# Patient Record
Sex: FEMALE | URBAN - METROPOLITAN AREA
[De-identification: names, ages, dates, MRNs, and addresses within clinical notes are randomized per-mention and may not be internally consistent; named-entity substitution may affect disease eponyms.]

---

## 2023-04-13 ENCOUNTER — APPOINTMENT (OUTPATIENT)
Dept: URBAN - METROPOLITAN AREA CLINIC 198 | Age: 58
Setting detail: DERMATOLOGY
End: 2023-04-13

## 2023-04-13 DIAGNOSIS — L71.8 OTHER ROSACEA: ICD-10-CM

## 2023-04-13 DIAGNOSIS — L82.1 OTHER SEBORRHEIC KERATOSIS: ICD-10-CM

## 2023-04-13 DIAGNOSIS — L738 OTHER SPECIFIED DISEASES OF HAIR AND HAIR FOLLICLES: ICD-10-CM

## 2023-04-13 DIAGNOSIS — L663 OTHER SPECIFIED DISEASES OF HAIR AND HAIR FOLLICLES: ICD-10-CM

## 2023-04-13 DIAGNOSIS — L82.0 INFLAMED SEBORRHEIC KERATOSIS: ICD-10-CM

## 2023-04-13 DIAGNOSIS — L91.8 OTHER HYPERTROPHIC DISORDERS OF THE SKIN: ICD-10-CM

## 2023-04-13 DIAGNOSIS — L40.0 PSORIASIS VULGARIS: ICD-10-CM

## 2023-04-13 DIAGNOSIS — D22 MELANOCYTIC NEVI: ICD-10-CM

## 2023-04-13 PROBLEM — D22.72 MELANOCYTIC NEVI OF LEFT LOWER LIMB, INCLUDING HIP: Status: ACTIVE | Noted: 2023-04-13

## 2023-04-13 PROBLEM — D22.5 MELANOCYTIC NEVI OF TRUNK: Status: ACTIVE | Noted: 2023-04-13

## 2023-04-13 PROBLEM — L02.425 FURUNCLE OF RIGHT LOWER LIMB: Status: ACTIVE | Noted: 2023-04-13

## 2023-04-13 PROBLEM — D22.71 MELANOCYTIC NEVI OF RIGHT LOWER LIMB, INCLUDING HIP: Status: ACTIVE | Noted: 2023-04-13

## 2023-04-13 PROBLEM — L02.426 FURUNCLE OF LEFT LOWER LIMB: Status: ACTIVE | Noted: 2023-04-13

## 2023-04-13 PROBLEM — D22.62 MELANOCYTIC NEVI OF LEFT UPPER LIMB, INCLUDING SHOULDER: Status: ACTIVE | Noted: 2023-04-13

## 2023-04-13 PROBLEM — D22.61 MELANOCYTIC NEVI OF RIGHT UPPER LIMB, INCLUDING SHOULDER: Status: ACTIVE | Noted: 2023-04-13

## 2023-04-13 PROCEDURE — OTHER COUNSELING: OTHER

## 2023-04-13 PROCEDURE — 17110 DESTRUCT B9 LESION 1-14: CPT

## 2023-04-13 PROCEDURE — OTHER PRESCRIPTION MEDICATION MANAGEMENT: OTHER

## 2023-04-13 PROCEDURE — OTHER PRESCRIPTION: OTHER

## 2023-04-13 PROCEDURE — OTHER SKIN TAG REMOVAL MULTI (COSMETIC): OTHER

## 2023-04-13 PROCEDURE — 99214 OFFICE O/P EST MOD 30 MIN: CPT | Mod: 25

## 2023-04-13 PROCEDURE — OTHER LIQUID NITROGEN: OTHER

## 2023-04-13 RX ORDER — CLINDAMYCIN PHOSPHATE 10 MG/ML
LOTION TOPICAL QD
Qty: 60 | Refills: 5 | Status: ERX | COMMUNITY
Start: 2023-04-13

## 2023-04-13 RX ORDER — CLOBETASOL PROPIONATE 0.5 MG/ML
SOLUTION TOPICAL QHS
Qty: 50 | Refills: 0 | Status: ERX | COMMUNITY
Start: 2023-04-13

## 2023-04-13 ASSESSMENT — LOCATION DETAILED DESCRIPTION DERM
LOCATION DETAILED: LEFT INFERIOR MEDIAL MALAR CHEEK
LOCATION DETAILED: LEFT PROXIMAL DORSAL FOREARM
LOCATION DETAILED: RIGHT INFERIOR MEDIAL MALAR CHEEK
LOCATION DETAILED: RIGHT DISTAL POSTERIOR THIGH
LOCATION DETAILED: RIGHT AXILLARY VAULT
LOCATION DETAILED: EPIGASTRIC SKIN
LOCATION DETAILED: RIGHT KNEE
LOCATION DETAILED: INFERIOR THORACIC SPINE
LOCATION DETAILED: RIGHT PROXIMAL DORSAL FOREARM
LOCATION DETAILED: LEFT DISTAL LATERAL POSTERIOR THIGH
LOCATION DETAILED: POSTERIOR MID-PARIETAL SCALP
LOCATION DETAILED: LEFT AXILLARY VAULT
LOCATION DETAILED: LEFT KNEE

## 2023-04-13 ASSESSMENT — LOCATION SIMPLE DESCRIPTION DERM
LOCATION SIMPLE: LEFT POSTERIOR THIGH
LOCATION SIMPLE: RIGHT CHEEK
LOCATION SIMPLE: ABDOMEN
LOCATION SIMPLE: LEFT KNEE
LOCATION SIMPLE: RIGHT AXILLARY VAULT
LOCATION SIMPLE: RIGHT FOREARM
LOCATION SIMPLE: POSTERIOR SCALP
LOCATION SIMPLE: UPPER BACK
LOCATION SIMPLE: LEFT CHEEK
LOCATION SIMPLE: RIGHT POSTERIOR THIGH
LOCATION SIMPLE: RIGHT KNEE
LOCATION SIMPLE: LEFT AXILLARY VAULT
LOCATION SIMPLE: LEFT FOREARM

## 2023-04-13 ASSESSMENT — LOCATION ZONE DERM
LOCATION ZONE: FACE
LOCATION ZONE: AXILLAE
LOCATION ZONE: ARM
LOCATION ZONE: SCALP
LOCATION ZONE: TRUNK
LOCATION ZONE: LEG

## 2023-04-13 ASSESSMENT — ITCH NUMERIC RATING SCALE: HOW SEVERE IS YOUR ITCHING?: 0

## 2023-04-13 ASSESSMENT — BSA PSORIASIS: % BODY COVERED IN PSORIASIS: 0

## 2023-04-13 NOTE — PROCEDURE: COUNSELING
Detail Level: Zone
Detail Level: Detailed
Sunscreen Recommendations: recommend FBSE next visit
Detail Level: Simple

## 2023-04-13 NOTE — PROCEDURE: PRESCRIPTION MEDICATION MANAGEMENT
Continue Regimen: Clindamycin lotion bid prn flares
Detail Level: Detailed
Render In Strict Bullet Format?: No
Continue Regimen: Clobetasol solution prn

## 2023-04-13 NOTE — PROCEDURE: SKIN TAG REMOVAL MULTI (COSMETIC)
Total Number Of Lesions Treated: 10
Price (Use Numbers Only, No Special Characters Or $): 152.60
Consent: Written consent obtained and the risks of skin tag removal was reviewed with the patient including but not limited to bleeding, pigmentary change, infection, pain, and remote possibility of scarring.
Detail Level: Detailed
Hemostasis: Aluminum Chloride
Anesthesia Type: 1% lidocaine with epinephrine
Anesthesia Volume In Cc: 1
Removed With: scissors

## 2023-09-25 ENCOUNTER — APPOINTMENT (EMERGENCY)
Dept: RADIOLOGY | Facility: HOSPITAL | Age: 58
End: 2023-09-25
Payer: COMMERCIAL

## 2023-09-25 ENCOUNTER — HOSPITAL ENCOUNTER (EMERGENCY)
Facility: HOSPITAL | Age: 58
Discharge: HOME | End: 2023-09-25
Attending: EMERGENCY MEDICINE
Payer: COMMERCIAL

## 2023-09-25 VITALS
OXYGEN SATURATION: 100 % | HEART RATE: 58 BPM | TEMPERATURE: 97.6 F | RESPIRATION RATE: 16 BRPM | DIASTOLIC BLOOD PRESSURE: 87 MMHG | SYSTOLIC BLOOD PRESSURE: 136 MMHG

## 2023-09-25 DIAGNOSIS — S09.90XA HEAD INJURY, INITIAL ENCOUNTER: Primary | ICD-10-CM

## 2023-09-25 PROCEDURE — 70486 CT MAXILLOFACIAL W/O DYE: CPT | Mod: MG

## 2023-09-25 PROCEDURE — 99284 EMERGENCY DEPT VISIT MOD MDM: CPT | Mod: 25

## 2023-09-25 PROCEDURE — G1004 CDSM NDSC: HCPCS

## 2023-09-25 RX ORDER — METOPROLOL TARTRATE 25 MG/1
25 TABLET, FILM COATED ORAL DAILY
COMMUNITY

## 2023-09-25 NOTE — DISCHARGE INSTRUCTIONS
You were seen in the ED after being kicked in the head by a horse. Your workup, including CT, were negative for any acute findings including broken bones or intracranial bleeding.     You may follow up with your primary care provider as needed.     Please return to the ED if you have nausea, vomiting, worsening headache, changes in your vision, sensory changes, or develop difficulty walking.

## 2023-09-25 NOTE — ED ATTESTATION NOTE
Procedures      9/25/2023  2:11 PM  I have personally seen and examined the patient.  I personally performed the key components of the encounter and provided medical decision making for this patient. I reviewed and agree with the PA/NP/Resident's assessment and plan of care, with any exceptions as documented below.    My focused history, examination, assessment, and plan of care of Mónica Bates is as follows:    HPI: The patient is a 57 y.o. who comes to the ED for facial injury.  Was kicked in the head by a horse.  No LOC.  No prior history of concussion.  Was not wearing a helmet.  Not on any thinners.  Was initially confused about the incident but now starting to remember.  Did have transient nausea but resolved.  Has a little scrape on the left elbow but has no difficulty ranging and no significant pain.    I was provided additional history from: Self.    Pertinent past medical history includes: Hypertension      Exam: Awake, alert, pleasant, no distress.  Ecchymosis left periocular.  PERRL, EOMI.  Afebrile, normotensive, heart rate regular.  Speech clear.  GCS 15.  Moves all extremity spontaneously.  Vital Signs Review: Vital signs have been reviewed. The oxygen saturation is SpO2: 100 %  which is normal.          Impression/Plan/Medical decision making/ED course: Here for evaluation after head injury.  Neuro exam nonfocal.  CT head without acute intracranial traumatic findings.  Hematoma left frontal scalp noted.  She has full extraocular movements.  Discussed possible concussive symptoms and refer to concussion clinic if needed.  Family members at bedside.  Patient comfortable with discharge           Imaging:  I independently reviewed imaging done in the ED as a wet read.    Disposition: DC            NOTE: Patient seen during a time of significantly increased volumes, decreased capacity and staff. Portion of management and initial evaluation may have been done while in the waiting room because of  this. This document was created using dragon dictation software.  There might be some typographical errors due to this technology.         Queta Goodwin MD  09/25/23 1530

## 2023-09-25 NOTE — ED PROVIDER NOTES
"Emergency Medicine Note  HPI   HISTORY OF PRESENT ILLNESS     HPI   Patient is a 57F no significant PMHx presents to the ED s/p being kicked in the head by a horse. Patient states incident happened PTA. No helmet. Struck in face. - thinners. ?LOC as patient states she was \"in and out\" indicating amnesia to event. Some lightheadedness after assoc with nausea though no longer lightheaded or nauseated. Now c/o pain in L side of face and a scrape on dorsal aspect L elbow. Patient denies double vision, blurred vision.       Patient History   PAST HISTORY     Reviewed from Nursing Triage:       History reviewed. No pertinent past medical history.    Past Surgical History:   Procedure Laterality Date    KNEE SURGERY Right        History reviewed. No pertinent family history.    Social History     Tobacco Use    Smoking status: Never    Smokeless tobacco: Never   Substance Use Topics    Alcohol use: Yes    Drug use: Never         Review of Systems   REVIEW OF SYSTEMS     Review of Systems      VITALS     ED Vitals    Date/Time Temp Pulse Resp BP SpO2 [facility]   09/25/23 1317 36.4 °C (97.6 °F) 58 16 136/87 100 % BL                       Physical Exam   PHYSICAL EXAM     Physical Exam  Constitutional:       General: She is not in acute distress.     Appearance: She is normal weight. She is not ill-appearing.   HENT:      Head:      Comments: Periorbital ecchymosis, edema  Small hematoma to L forehead     Nose: Nose normal. No congestion.      Mouth/Throat:      Mouth: Mucous membranes are moist.      Pharynx: Oropharynx is clear.      Comments: No loose teeth; jaw stable  Eyes:      Extraocular Movements: Extraocular movements intact.      Conjunctiva/sclera: Conjunctivae normal.      Pupils: Pupils are equal, round, and reactive to light.      Comments: Pupils 2 and reactive   Cardiovascular:      Rate and Rhythm: Normal rate and regular rhythm.      Pulses: Normal pulses.      Heart sounds: Normal heart sounds.   Pulmonary: "      Effort: Pulmonary effort is normal.      Breath sounds: Normal breath sounds.   Abdominal:      General: Abdomen is flat. There is no distension.      Palpations: Abdomen is soft.      Tenderness: There is no abdominal tenderness.   Musculoskeletal:         General: No swelling, tenderness or deformity.      Comments: Strength 5/5 BL UEs   Skin:     General: Skin is warm and dry.      Findings: Bruising present.      Comments: Superficial abrasion to dorsal aspect of L elbow   Neurological:      General: No focal deficit present.      Mental Status: She is alert and oriented to person, place, and time.      Cranial Nerves: No cranial nerve deficit.      Sensory: No sensory deficit.      Motor: No weakness.   Psychiatric:         Mood and Affect: Mood normal.         Behavior: Behavior normal.           PROCEDURES     Procedures     DATA     Results     None          Imaging Results    None         No orders to display       Scoring tools                                  ED Course & MDM   MDM / ED COURSE / CLINICAL IMPRESSION / DISPO     Medical Decision Making  Amount and/or Complexity of Data Reviewed  Radiology: ordered.        Patient is a 57F no significant PMHx presents to the ED s/p being kicked in the head by a horse. AAOx3. GCS15. Moving all exremities. CTH and CT facial bones no acute intraranial hemorrhage or calvarial fracture, no facial bone fracture, orbit intact; sot tissue sweling and hematoma to L forehead. Pain well controlled. NAD. VSS. Patient given strict return precautions, DC instructions. Patient discussed with Dr Goodwin.   ED Course as of 09/25/23 1522   Mon Sep 25, 2023   1521 CTH  IMPRESSION:  1. No evidence of acute intracranial pathology or calvarial fracture.  2. There is soft tissue swelling and hematoma formation within the left frontal  scalp which extends to involve the left preseptal periorbital tissues. The globe  is intact and there is no evidence of retrobulbar hemorrhage.  3.  No evidence of orbital or facial bone fracture. []      ED Course User Index  [] April Hong DO     Clinical Impression      None               April Hong DO  Resident  09/25/23 7971

## 2023-09-29 ENCOUNTER — HOSPITAL ENCOUNTER (EMERGENCY)
Facility: HOSPITAL | Age: 58
Discharge: HOME | End: 2023-09-29
Attending: EMERGENCY MEDICINE | Admitting: EMERGENCY MEDICINE
Payer: COMMERCIAL

## 2023-09-29 ENCOUNTER — APPOINTMENT (EMERGENCY)
Dept: RADIOLOGY | Facility: HOSPITAL | Age: 58
End: 2023-09-29
Payer: COMMERCIAL

## 2023-09-29 VITALS
HEART RATE: 62 BPM | OXYGEN SATURATION: 97 % | TEMPERATURE: 97 F | DIASTOLIC BLOOD PRESSURE: 80 MMHG | RESPIRATION RATE: 16 BRPM | SYSTOLIC BLOOD PRESSURE: 166 MMHG

## 2023-09-29 DIAGNOSIS — S09.90XA CLOSED HEAD INJURY, INITIAL ENCOUNTER: Primary | ICD-10-CM

## 2023-09-29 DIAGNOSIS — S06.0X0A CONCUSSION WITHOUT LOSS OF CONSCIOUSNESS, INITIAL ENCOUNTER: ICD-10-CM

## 2023-09-29 DIAGNOSIS — H57.12 PAIN OF LEFT EYE: ICD-10-CM

## 2023-09-29 PROCEDURE — 99284 EMERGENCY DEPT VISIT MOD MDM: CPT | Mod: 25

## 2023-09-29 PROCEDURE — G1004 CDSM NDSC: HCPCS

## 2023-09-29 ASSESSMENT — TONOMETRY
OS_IOP_MMHG: 20
OD_IOP_MMHG: 20

## 2023-09-29 ASSESSMENT — VISUAL ACUITY: OU: 1

## 2023-09-29 NOTE — ED ATTESTATION NOTE
Procedures  Physical Exam  Review of Systems  Medical Decision Making      9/29/20233:11 PM  I have personally seen and examined the patient.  I personally performed the key components of the encounter and provided a significant portion of the care and medical decision making for this patient. I reviewed and agree with the PA/NP/Resident's assessment and plan of care, with any exceptions as documented below    My focused history, examination, assessment, and plan of care of Mónica Bates is as follows:  The history is provided by Patient  The patient is a 57 y.o. who comes to the ED for left eye pressure, discomfort,  No new trauma.  Patient had an old trauma to the left face.  Patient with significant bruising.  Patient notes that she has been dealing with the symptoms since her prior presentation here but had not had significant headaches or dizziness.    Pertinent past medical history includes: Noncontributory  History reviewed. No pertinent past medical history.      Past Surgical History:   Procedure Laterality Date    KNEE SURGERY Right        Exam: Ecchymosis overlying the left eye. Some reproducible symptoms with certain movements      Impression/Plan/Medical decision making/ED course: 57-year-old presenting with left eye pressure, discomfort, no new trauma, patient recently kicked in the head by a horse, initial differential was for possibility of subdural hematoma, but also consider postconcussive type syndrome, less likely I specifically related pathology, eye pressures were obtained, visual acuity was also good, patient had CT scan which was reassuring and patient was subsequently discharged with instructions for close follow-up.  She is otherwise to return with any new or worsening symptoms.       The patient The patient was discharged after careful consideration for any admission needs    Medications - No data to display    ECG review: ECG read by me: N/A   No orders to display       While here I  have   Reviewed previous records in our system  Reviewed prior outpatient and ER visits  Reviewed care everywhere for outside records Reviewed prior outpatient and ER visits  Reviewed and interpreted lab results N/A  Reviewed and interpreted xrays N/A  Compared lab results to previous lab results N/A  Compared image results to previous results .  Called and spoke with a consultant or physician about this case N/A  Reviewed and independently interpreted ultrasound results and images N/A  Reviewed and independently interpreted CT imaging results and images Overall reassuring without significant abnormalities    Vital Signs Review: Vital signs have been reviewed. The oxygen saturation is SpO2: 98 %  which is Normal      I was physically present for the key/critical portions of the following procedures: none     Lennox Shah MD  09/30/23 0152

## 2023-09-29 NOTE — DISCHARGE INSTRUCTIONS
You were seen and evaluated in the emergency department today for eye pain/pressure.  Your CT scan does not show any signs of bleeding inside the brain.  This could be due to a concussion/head injury.  Please follow-up with the concussion clinic at [facility].  Please also follow-up with your eye doctor.    Make sure that you are staying hydrated and drinking lots of fluids.  Please alternate water and electrolyte solution such as Gatorade or Pedialyte.    You may take Tylenol or Motrin as needed for pain as directed on the bottle.  Please do not exceed 3 g of Tylenol in 24 hours.    Please continue to limit prolonged screen time time in front of TV/cell phone/computers    Return to the emergency department any worsening symptoms, confusion, persistent vomiting

## 2023-09-29 NOTE — ED PROVIDER NOTES
"Emergency Medicine Note  HPI   HISTORY OF PRESENT ILLNESS     Patient is a 57-year-old female with no significant past medical history who presents emergency department for evaluation for headache and left eye pressure.  Patient was recently seen and evaluated here after being kicked by a horse in the head on 9/25/2023.  Patient had negative CT head/facial bones for any acute bleeding or fractures but did have some soft tissue swelling.  Patient ports that over the last couple of days she has noted increased pressure behind the left eye and has noted some visual changes on the peripheral aspects of her vision in her left eye and states it seems \"lighter than normal\".  Patient wears glasses/contacts and was recently seen by her eye doctor a couple of months ago.  Denies any eye redness or tearing.  When the injury occurred patient had questionable loss of consciousness and is slightly amnestic to the event and was told that she was confused at first.  She is not on any blood thinning medications.  Denies any neck or back pain, nausea, vomiting, chest pain, shortness of breath, numbness or weakness.  She reports some dizziness and lightheadedness with certain eye/head movements and whenever she is ambulating.    9/25/23 CTH and Facial bones  IMPRESSION:  1. No evidence of acute intracranial pathology or calvarial fracture.  2. There is soft tissue swelling and hematoma formation within the left frontal  scalp which extends to involve the left preseptal periorbital tissues. The globe  is intact and there is no evidence of retrobulbar hemorrhage.  3. No evidence of orbital or facial bone fracture.            Patient History   PAST HISTORY     Reviewed from Nursing Triage:       History reviewed. No pertinent past medical history.    Past Surgical History:   Procedure Laterality Date    KNEE SURGERY Right        History reviewed. No pertinent family history.    Social History     Tobacco Use    Smoking status: Never "    Smokeless tobacco: Never   Substance Use Topics    Alcohol use: Yes    Drug use: Never         Review of Systems   REVIEW OF SYSTEMS     Review of Systems      VITALS     ED Vitals    Date/Time Temp Pulse Resp BP SpO2 [facility]   09/29/23 1327 36.1 °C (97 °F) 62 16 166/80 97 % BL                       Physical Exam   PHYSICAL EXAM     Physical Exam  Vitals and nursing note reviewed.   Constitutional:       Appearance: She is normal weight. She is not ill-appearing, toxic-appearing or diaphoretic.   HENT:      Head: Normocephalic. Contusion present. No raccoon eyes, Ta's sign, abrasion, right periorbital erythema, left periorbital erythema or laceration.      Comments: Bilateral periorbital ecchymosis left greater than right     Right Ear: External ear normal.      Left Ear: External ear normal.      Nose: Nose normal. No nasal deformity, signs of injury or nasal tenderness.      Mouth/Throat:      Lips: Pink.      Mouth: Mucous membranes are moist.      Pharynx: Oropharynx is clear.   Eyes:      General: Lids are normal. Vision grossly intact. Gaze aligned appropriately. No scleral icterus.     Intraocular pressure: Right eye pressure is 20 mmHg. Left eye pressure is 20 mmHg.      Extraocular Movements: Extraocular movements intact.      Conjunctiva/sclera: Conjunctivae normal.      Pupils: Pupils are equal, round, and reactive to light.      Comments: Extraocular movements are intact although patient does report some pain with eye movements.  Visual acuity 20/13 left, 20/10 right   Cardiovascular:      Rate and Rhythm: Normal rate and regular rhythm.      Pulses: Normal pulses.      Heart sounds: Normal heart sounds. No murmur heard.  Pulmonary:      Effort: Pulmonary effort is normal. No respiratory distress.      Breath sounds: Normal breath sounds.   Musculoskeletal:         General: Normal range of motion.      Cervical back: Normal range of motion and neck supple. No tenderness.   Skin:     General: Skin  is warm and dry.      Capillary Refill: Capillary refill takes less than 2 seconds.   Neurological:      General: No focal deficit present.      Mental Status: She is alert and oriented to person, place, and time.      GCS: GCS eye subscore is 4. GCS verbal subscore is 5. GCS motor subscore is 6.      Cranial Nerves: Cranial nerves 2-12 are intact.      Sensory: Sensation is intact.      Motor: Motor function is intact.      Coordination: Coordination is intact.      Gait: Gait is intact.           PROCEDURES     Procedures     DATA     Results     None          Imaging Results          CT HEAD WITHOUT IV CONTRAST (Final result)  Result time 09/29/23 16:23:01    Final result                 Impression:    IMPRESSION:    1. No CT evidence of an acute large vascular territorial infarct, acute  intracranial hemorrhage or intra-axial mass effect.  2. Minimally low-lying right cerebellar tonsil.  3. Mild nonspecific periventricular white matter disease. This may be related to  chronic microangiopathy in the appropriate clinical setting.             Narrative:      CLINICAL HISTORY:  Headache and left eye pressure. Recent trauma.    COMMENT:  An unenhanced CT scan of the brain was performed from the foramen magnum to the  vertex. Coronal and sagittal reconstructions were obtained.    CT DOSE:  One or more dose reduction techniques (e.g. automated exposure  control, adjustment of the mA and/or kV according to patient size, use of  iterative reconstruction technique) utilized for this examination.    Comparison: Brain CT performed 9/25/2023      Findings:  The right cerebellar tonsil is minimally low-lying, compared to the prior. The  ventricles, sulci and cisternal spaces are unremarkable. Mild nonspecific  periventricular white matter disease is present which appears similar. There is  no loss of the gray-white matter differentiation to suggest an acute large  vascular territorial infarction.  No acute intracranial  "hemorrhage, intra-axial  mass effect or extra-axial fluid collection is identified.    A left frontal scalp hematoma is present which is diminished in size compared to  the prior. There is no evidence of a depressed calvarial fracture. The  visualized portions of the paranasal sinuses and mastoid air cells appear  patent.                                  No orders to display       Scoring tools                                  ED Course & MDM   MDM / ED COURSE / CLINICAL IMPRESSION / DISPO     Medical Decision Making  Patient is a 57-year-old female with no significant past medical history who presents emergency department for evaluation for headache and left eye pressure.  Patient was recently seen and evaluated here after being kicked by a horse in the head on 9/25/2023.  Patient had negative CT head/facial bones for any acute bleeding or fractures but did have some soft tissue swelling.  Patient ports that over the last couple of days she has noted increased pressure in the left eye and has noted some visual changes on the peripheral aspects of her vision in her left eye and states it seems \"lighter than normal\".  Patient wears glasses/contacts and was recently seen by her eye doctor a couple of months ago.  When the injury occurred patient had questionable loss of consciousness and is slightly amnestic to the event and was told that she was confused at first.  She is not on any blood thinning medications.  Denies any neck or back pain, nausea, vomiting, chest pain, shortness of breath, numbness or weakness.  She reports some dizziness and lightheadedness with certain eye/head movements and whenever she is ambulating.    Vital Signs Review: Vital signs have been reviewed. The oxygen saturation is SpO2: 97 % which is normal.    Plan: CTH    Repeat head CT negative for any delayed bleed.  Let patient follow-up with PCP, eye specialist, and concussion clinic as needed.    Closed head injury, initial encounter: acute " illness or injury  Concussion without loss of consciousness, initial encounter: acute illness or injury  Pain of left eye: acute illness or injury  Amount and/or Complexity of Data Reviewed  Independent Historian: spouse  External Data Reviewed: radiology and notes.  Radiology: ordered. Decision-making details documented in ED Course.      Risk  OTC drugs.          ED Course as of 09/29/23 1713   Fri Sep 29, 2023   1526 Discussed with ED attending, Dr. Palacio, who is in agreement with plan. Also saw/evaluated patient [GS]   1642 CT HEAD WITHOUT IV CONTRAST  IMPRESSION:   IMPRESSION:     1. No CT evidence of an acute large vascular territorial infarct, acute  intracranial hemorrhage or intra-axial mass effect.  2. Minimally low-lying right cerebellar tonsil.  3. Mild nonspecific periventricular white matter disease. This may be related to  chronic microangiopathy in the appropriate clinical setting.  1. No CT evidence of an acute large vascular territorial infarct, acute  intracranial hemorrhage or intra-axial mass effect.  2. Minimally low-lying right cerebellar tonsil.  3. Mild nonspecific periventricular white matter disease. This may be related to  chronic microangiopathy in the appropriate clinical setting. [GS]   1710 Discharge paperwork reviewed with patient.  Follow-up instructions and return precautions given.  Patient verbalizes understanding and ambulated out of the emergency department with safe and steady gait. Accompanied by spouse [GS]      ED Course User Index  [GS] Valery Reed PA C     Clinical Impression      Closed head injury, initial encounter   Concussion without loss of consciousness, initial encounter   Pain of left eye     _________________     ED Disposition   Discharge                   Valery Reed PA C  09/29/23 1713

## 2023-10-03 ENCOUNTER — TRANSCRIBE ORDERS (OUTPATIENT)
Dept: SCHEDULING | Facility: REHABILITATION | Age: 58
End: 2023-10-03

## 2023-10-03 DIAGNOSIS — S06.0X0S CONCUSSION WITHOUT LOSS OF CONSCIOUSNESS, SEQUELA (CMS/HCC): Primary | ICD-10-CM

## 2023-10-03 DIAGNOSIS — R51.9 HEADACHE: ICD-10-CM

## 2023-10-25 ENCOUNTER — HOSPITAL ENCOUNTER (OUTPATIENT)
Dept: OCCUPATIONAL THERAPY | Age: 58
Setting detail: THERAPIES SERIES
Discharge: HOME | End: 2023-10-25
Attending: PHYSICAL MEDICINE & REHABILITATION
Payer: COMMERCIAL

## 2023-10-25 DIAGNOSIS — S06.0X0S CONCUSSION WITHOUT LOSS OF CONSCIOUSNESS, SEQUELA (CMS/HCC): Primary | ICD-10-CM

## 2023-10-25 PROCEDURE — 97535 SELF CARE MNGMENT TRAINING: CPT | Mod: GO

## 2023-10-25 PROCEDURE — 97166 OT EVAL MOD COMPLEX 45 MIN: CPT | Mod: GO

## 2023-10-25 NOTE — PATIENT INSTRUCTIONS
Request Type: New Evaluation    Type of Visit: Single Serve    Evaluation Date: 10/25/2023      POC/Schedule:  2x/week for 8 weeks (will schedule out for 10 weeks)    10/25/2023 - 01/03/2024                                Does patient require authorization prior to treatments?: no     Designation: 1:1            Scheduling Instructions: Schedule with lead as much as possible    Lead therapist: Gianni Henry (resource) evaluated    Comments: Patient will be out of town November 20-28    Subgroups:

## 2023-10-25 NOTE — OP OT TREATMENT LOG
VISION/CONCUSSION OT FLOW SHEET    OT Vision/mTBI  EXERCISES CURRENT SESSION TIME   NEURO RE-ED  CPT 67127 TOTAL TIME FOR SESSION Not performed   Dynavision     VTS3/VTS4     CPT     BITs     NVR     Saccadic Fixation     Ocular Motor Control     Visual Tracing     3D Tracking     Visual Perception     Convergence/Divergence     Accommodation     Visual Stimulation     THER ACT  CPT 08308 TOTAL TIME FOR SESSION 38-52 Minutes   Assessment Completed initial evaluation - see note for details     Pain, Vitals, Meds, Etc. Assessed/monitored throughout session    HEP     Visual Endurance      Work/School Simulation      SELF CARE  CPT 24706 TOTAL TIME FOR SESSION 8-22 Minutes   PCS Symptom Management/Education Provided education re: OT role s/p mTBI to address functional visual system, importance of balancing provocation of symptoms w/ rest and recovery. Discussed nature of mTBI and impact on sympathetic nervous system regulation w/ emphasis on energy conservation technique implementation. Patient verbalizes good understanding of all education provided.         ADL/IADLs

## 2023-10-25 NOTE — PROGRESS NOTES
Occupational Therapy Evaluation    [facility] OP Therapy Fax: [phone]    OT EVALUATION FOR OUTPATIENT THERAPY    Patient: Mónica Bates   MRN: [id number]  : 1965 57 y.o.    Referring Physician: Noel Grigsby, *  Date of Visit: 10/25/2023    Certification Dates:  10/25/23 through 24    Recommended Frequency & Duration:  2 times/week for up to  (10 weeks)   2x/week for 8 weeks - will request plan of care for 10 weeks to allow for scheduling (plans to leave state in November)    Diagnosis:   1. 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    Past Surgical History:   Past Surgical History:   Procedure Laterality Date    KNEE SURGERY Right        LEARNING ASSESSMENT    Assessment completed: Yes    Learner name:  Mónica Bates    Learner: Patient    Learning Barriers:  Learning barriers: No Barriers    Preferred Language: English     Needed: No    Education Provided:   Method: Discussion  Readiness: acceptance  Response: Demonstrated understanding      CO-LEARNER ASSESSMENT:    Completed: No    Welcome letter discussed: No    OBJECTIVE MEASUREMENTS/DATA:    Time In Session:  Start Time: 1100  Stop Time: 1200  Time Calculation (min): 60 min   Assessment - 10/25/23 1050        Assessment    Plan of Care reviewed and patient/family in agreement Yes     System  "Pathology/Pathophysiology Noted neuromuscular     Functional Limitations in Following Categories home management;community/leisure     Problem List: Occupational Therapy functional vision;pain;sensory feedback impaired     Rehab Potential/Prognosis: Occupational Therapy good, to achieve stated therapy goals     Clinical Assessment Mónica Bates is a 57 year-old female without history of previous concussions referred to OP OT to address functional visual impairments related to mTBI after being kicked in the head by a horse. Prior to her concussion, she was fully independent, active, recently retired from [employer] and enjoyed spending 4-6 hours/day tending to her horses. 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 Subjectively, she rates her current level of function at 70%, describes her symptoms as \"mild to moderate\" in severity and scores a 21/64 on the [place] Post-Concussion Symptom Questionnaire. Based on objective testing today, many of her scores were considered within functional limits; however, she demonstrates mild-moderate pain w/ multi-directional oculomotor ROM testing, especially toward the right and superiorly. Her short horizontal saccadic fixation time is slightly below functional limits at 6.09' buther binocular speed of reading per the Pankaj Devick Test is considered normal at 47'. She reports an increase in her left-sided headache from 2/10 at start of session to 3/10 by end of testing. She also endorses occasional left eye \"illumination\" and \"increased brightness\" in left peripheral visual field, which she also shared with her optometrist. She would benefit from initiation of OP OT to address remaining functional visual impairments at 2x/week frequency for up to 8 weeks. Will request certification for 10 weeks due to her " vacation in November.     Plan and Recommendations Initiate OP OT. Patient is somewhat higher level - progress visual endurance, clutter, cognition, multi-stimuli tolerance and positional changes to progress to PLOF.     Planned Services CPT 63101 Neuromuscular Reeducation;CPT 24818 Therapeutic activities;CPT 28339 Hot/Cold Packs therapy;CPT 60303 Therapeutic exercises;CPT 58581 Self-care/Home management training;CPT 94391 Sensory integration     Comments/Additional Services BITS, Dynavision, UBE                General Information - 10/25/23 1050        Session Details    Document Type initial evaluation     Mode of Treatment individual therapy        General Information    Onset of Illness/Injury or Date of Surgery 09/25/23     Referring Physician Dr. Noel Grigsby     History of present illness/functional impairment Mónica Bates is a 57 year-old female who is referred to outpatient occupational therapy to address functional visual impairments related to mTBI. On 9/25/23, she was kicked in the head (without a helmet) by a horse. She reports a questionable loss of consciousness as she was in and out, indicating possible amnesia to the event, and reports confusion and disorientation 15-20 minutes after the event. She went to the ED immediately following the incident reporting lightheadedness, nausea, left-sided facial pain and scrape on dorsal aspect of left elbow. A CT scan of her head/facial bones was negative for any acute bleeding or fractures but she did have some soft tissue swelling. She returned to the ED on 9/29/23 with complaints of left eye visual changes, increased left eye pressure and hypertension. Her ocular pressures were elevated between 22-24 but repeat imaging was negative for delayed bleeding. She was instructed to follow-up with her PCP, optometrist and concussion clinic. Her optometrist appointment was unremarkable and her ocular pressures had decreased to 13-14 at that point. She  "denies any previous concussions. She arrives to OT evaluation wearing a custom-made left achilles ankle brace due to micro-tears from this past January 2023 and she is wearing a right wrist short-arm thumb spica splint due to a minor sprain. She reports three previous right knee surgeries and left hip lateral tear related to injuries from horse-back riding. She was also diagnosed with SVT and possible A-Fib in December 2022 and was started on Metropolol. 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 She reports daily headaches and residual left eye pain and swelling as well as increasing \"lighting illumination\" in left peripheral visual field. She spoke with an optometrist about this who reports no concerns.     Patient/Family/Caregiver Comments/Observations Mónica arrives on time for OP OT evaluation. She drove herself here but her  is with her in case of elevated symptoms post-evaluation.     Existing Precautions/Restrictions cardiac     Precautions comments Diagnosed w/ SVT and possible A-Fib in December 2022 - started on Metropolol         Services    Do You Speak a Language Other Than English at Home? no        Behavioral Health Related Communication    Suicidal Ideation No                Pain/Vitals - 10/25/23 1050        Pain Assessment    Currently in pain Yes     Preferred Pain Scale number (Numeric Rating Pain Scale)     Pain Side/Orientation left;anterior     Pain: Body location Head     Pain Rating (0-10): Pre Activity 2     Pain Rating (0-10): Activity 3     Pain Rating (0-10): Post Activity 2        Pre Activity Vital Signs    Pulse 60     SpO2 97 %     Oxygen Therapy None (Room air)     /81     BP Location Right upper arm     BP Method Automatic     Patient Position Sitting        Pain Interventions    Intervention  Monitored throughout session; rest breaks provided     Post " Intervention Comments See post-pain rating                OT - 10/25/23 1050        Occupational Therapy Encounter Type Details    Occupational Therapy Specialty MTBI OT        OT Frequency and Duration    Frequency of treatment 2 times/week     OT Duration --   10 weeks    OT Custom Frequency and Duration 2x/week for 8 weeks - will request plan of care for 10 weeks to allow for scheduling (plans to leave state in November)     OT Cert From 10/25/23     OT Cert To 01/03/24     Date OT POC was sent to provider 10/25/23     Signed OT Plan of Care received?  No                Falls/Food Screening - 10/25/23 1050        Initial Falls Assessment    One or more falls in the last year Yes     How many times 1     Was the patient injured in any fall Yes   mTBI; reason for referral    Fall prevention interventions recommended Schedule individual therapy sessions as appropriate     Recommended plan to address falls Initiate OP OT        Food Insecurity    Within the past 12 months, you worried that your food would run out before you got the money to buy more. Never true     Within the past 12 months, the food you bought just didn't last and you didn't have money to get more. Never true                 PLOF:    Prior Level of Function - 10/25/23 1050        OTHER    Previous level of function Prior to mTBI, fully active/independent. Wears a left custom-made achilles brace due to previous injury and recurrent micro-tears in January 2023 and right wrist short thumb spica splint due to sprain. 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 She was not riding immediately prior to her mTBI as her horse was in rehab; however, this is an eventual goal for her.               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; two-story house w/ 1 AMADOU + porch + 1 AMADOU from front or 2 AMADOU from mudroom or 5-10 AMADOU from back entrance; full flight to second floor bedroom/bathroom w/ walk-in shower w/ built-in bench and bar               Reading and Writing     Reading and Writing - 10/25/23 1050        Reading and Writing Assessment    Reading (comments) Functional reading on paper = 2 minutes without readers, a few hours with reading glasses; Functional reading on screen = unlimited at far distance, up to 4 hours w/ [place]               BADL/IADL    BADL/IADL - 10/25/23 1050        BADL Interventions Assessment    Energy Conservation Techniques activity pacing encouraged     Upper Body Dressing Independent     Lower Body Dressing Independent     Bathing Modified independence     Bathing comments Occasional use of grab bar to steady self     Toileting Independent     Grooming Independent     Eating Independent        IADL/Home Interventions Assessment    Driving and community mobility Modified independence     Driving and community mobility comments Has recently resumed driving without issue. Reports baseline difficulty driving at night due to sensitivity to bright lights     Meal prep / clean up comments Does not cook or clean heavily at baseline     Other (comments) Current level of function = 70%                Work and School     Work and School Assessment - 10/25/23 1050        Work/School/Leisure Assessment    Exercise Assessment (comments) Recently bought a \"[employer]\" swim spa - which is an 8' x 15' pool w/ a treadmill. Has trialed swimming/walking in the pool without symptoms               Outcome Measures:    Outcome Measures - 10/25/23 1050        Subjective Outcome Measures    [facility] 21/64               Vision     Vision - 10/25/23 1050        Vision Assessment    Visual Impairment/Limitations near/reading vision impaired;light sensitivity   Wears contact lenses 50% of the time and reading " "glasses as needed. Last optometry appointment was 10/05. Reports \"increased brightness\" in left peripheral field - optometrist reports no concerns w/ this       Oculomotor Control    Left eye assessed? Yes     Right eye assessed? Yes     Superior assessed? Yes     Inferior assessed? Yes     Diagonal assessed? Yes     Circular assessed? Yes     Left AROM without target ROM Intact     Left oculomotor AROM Discomfort Moderate     Left gaze fixation (10 second hold) Able      Right AROM without target ROM Intact      Right oculomotor AROM Discomfort Moderate     Right gaze fixation (10 second hold) Able     Superior AROM without target ROM Intact      Superior oculomotor AROM Discomfort Moderate     Superior gaze fixation (10 second hold) Able     Inferior AROM without target ROM Intact     Inferior oculomotor AROM Discomfort Mild     Inferior gaze fixation (10 second hold) Able     Diagonal AROM without target ROM Intact     Diagonal oculomotor AROM Discomfort Mild     Circular AROM without target ROM Intact     Circular oculomotor AROM Discomfort Moderate        Eye Teaming    Convergence Impaired   Blurred vision but no increase in pain or diplopia at 5\"    Divergence Intact     Accommodation Intact     Smooth Pursuits Impaired   Left eye - intact; increased pressure and ache; Right eye - midl incoordination; no increase in pressure and pain; Binocular - WFL    Nystagmus No        Saccadic Fixation Speed    Vertical Saccadic Fixation WFL     Horizontal Saccadic Fixation Impaired     Horizontal Saccades H1 6.7 Seconds     Horizontal Saccades H2 7.79 Seconds   1 error    Horizontal Saccades H3 5.17 Seconds   1 error    Horizontal Saccades H4 4.7 Seconds     Horizontal Saccades trials average 6.09 Seconds     Vertical Saccades V1 4.67 Seconds     Vertical Saccades V2 3.9 Seconds     Vertical Saccades V3 4.47 Seconds     Vertical Saccades V4 3.97 Seconds     Vertical Saccades trials average 4.25 Seconds     Pankaj Devick " "Test #1 (seconds) 17.03 Seconds     Pankaj Devick Test #2 (seconds) 13.88 Seconds     Pankaj Devick Test #3 (seconds) 16.3 Seconds     Pankaj Devick Total Time 47.21 Seconds     Comments Performed with contact lenses only - reading glasses not available     Pankaj Devick Errors #1 5     Pankaj Devick Errors #2 3     Pankaj Devick Errors #3 2     Pankaj Devick Total Errors 10        Visual Reaction Timing    Dynavision Denies symptoms; performed in stance at board with contact lenses - reading glasses not available     Dynavision Score (Mode B, 5 sec light timer) Targets 82     Dynavision Score Avg response time 0.72 Seconds     Dynavision Score Overall Impaired   Will set LTG for 0.65'                GOALS:     Goals        Patient Stated      <enter goal here> (pt-stated)       Patient stated... (pt-stated)       Patient stated..     \"I love to read. I need to get back to tolerating reading.\"         Other      Mutually agreed upon pain goal       Mutually agreed upon pain goal: Patient will report headache severity at less than a 2/10 on a daily basis.         OT - mTBI (October 2023)       Short Term Goals Time Frame Result Comment   Full ocular motor range of motion and control with mild symptoms for fixation >/= 10 seconds in multi-directional planes  4 weeks     Convergence less than or equal to 3 inches for near-focus tasks of reading and computer use with minimal symptoms 4 weeks  IE = 5\"   Visual-motor reaction time </= 0.70 seconds via Dynavision 5 Second Red assessment with minimal  symptoms to maximize safety with driving.  4 weeks  IE = 0.72'   Saccadic fixation speed of </= 5.5 seconds as measured by short horizontal saccade testing  4 weeks   IE = 6.09'    Patient will perform IADLs and community activities with mild-moderate symptoms as evidenced by a 10-point reduction on the [place] Post Concussion Symptoms Questionnaire By discharge  IE = 21/64   Patient will increase tolerance for leisurely reading on " paper to > 60 minutes with absent or manageable symptoms and use of reading glasses as needed.  By discharge      Patient will increase visual-motor reaction time to < 0.65 seconds via Dynavision 5 Second Red assessment to maximize safety and response time during horse care.  By discharge  IE = 0.72'    Patient will report ability to complete >/= 5 hours of horse care in multi-stimulating gym with absent or manageable symptoms. By discharge     Patient will be independent with visual home exercise program.  By discharge                      TREATMENT PLAN:    VISION/CONCUSSION OT FLOW SHEET    OT Vision/mTBI  EXERCISES CURRENT SESSION TIME   NEURO RE-ED  CPT 75112 TOTAL TIME FOR SESSION Not performed   Dynavision     VTS3/VTS4     CPT     BITs     NVR     Saccadic Fixation     Ocular Motor Control     Visual Tracing     3D Tracking     Visual Perception     Convergence/Divergence     Accommodation     Visual Stimulation     THER ACT  CPT 74035 TOTAL TIME FOR SESSION 38-52 Minutes   Assessment Completed initial evaluation - see note for details     Pain, Vitals, Meds, Etc. Assessed/monitored throughout session    HEP     Visual Endurance      Work/School Simulation      SELF CARE  CPT 94418 TOTAL TIME FOR SESSION 8-22 Minutes   PCS Symptom Management/Education Provided education re: OT role s/p mTBI to address functional visual system, importance of balancing provocation of symptoms w/ rest and recovery. Discussed nature of mTBI and impact on sympathetic nervous system regulation w/ emphasis on energy conservation technique implementation. Patient verbalizes good understanding of all education provided.         ADL/IADLs         This 57 y.o. year old female presents to OT with above stated diagnosis. Occupational Therapy evaluation reveals functional vision, pain, sensory feedback impaired resulting in home management, community/leisure limitations. Mónica Bates will benefit from skilled OT services to address  limitation, work towards rehab and patient goals and maximize PLOF of chosen ADLs.     Planned Services: The patients treatment will include CPT 78746 Neuromuscular Reeducation, CPT 43263 Therapeutic activities, CPT 67644 Hot/Cold Packs therapy, CPT 60586 Therapeutic exercises, CPT 68493 Self-care/Home management training, CPT 34043 Sensory integration,BITS, Dynavision, UBE.    Roly Henry, OT

## 2023-10-31 ENCOUNTER — HOSPITAL ENCOUNTER (OUTPATIENT)
Dept: OCCUPATIONAL THERAPY | Age: 58
Setting detail: THERAPIES SERIES
Discharge: HOME | End: 2023-10-31
Attending: PHYSICAL MEDICINE & REHABILITATION
Payer: COMMERCIAL

## 2023-10-31 DIAGNOSIS — S06.0X0S CONCUSSION WITHOUT LOSS OF CONSCIOUSNESS, SEQUELA (CMS/HCC): Primary | ICD-10-CM

## 2023-10-31 PROCEDURE — 97530 THERAPEUTIC ACTIVITIES: CPT | Mod: GO

## 2023-10-31 PROCEDURE — 97112 NEUROMUSCULAR REEDUCATION: CPT | Mod: GO

## 2023-10-31 NOTE — OP OT TREATMENT LOG
VISION/CONCUSSION OT FLOW SHEET    OT Vision/mTBI  EXERCISES CURRENT SESSION TIME   NEURO RE-ED  CPT 09981 TOTAL TIME FOR SESSION 45 mins   Dynavision 10/31/23 Inner 3 rings;  Trial 1: 86 targets with RT of .70 sec    Inner 4 rings:  Trail 1: 90 targets with RT of .66 sec    Full board:   Trial 1: 85 targets with RT of .70 sec    VTS3/VTS4     CPT     BITs     NVR     Saccadic Fixation 10/31/23 Spot It:  Vertical, horizontal and diagonal saccades to identify same object on 2 cards    Ocular Motor Control     Visual Tracing     3D Tracking     Visual Perception     Convergence/Divergence     Accommodation     Visual Stimulation 10/31/23 Pt challenged with visual clutter worksheets.  Pt to complete at home    THER ACT  CPT 92226 TOTAL TIME FOR SESSION 10 mins   Assessment     Pain, Vitals, Meds, Etc. Assessed/monitored throughout session    HEP 10/31/23:  OT edu Pt on and issued:   Ocular ROM  Level 1 horizontal saccades  Seek and find, match ups   Pt verbalized understanding    Visual Endurance      Work/School Simulation      SELF CARE  CPT 35356 TOTAL TIME FOR SESSION    PCS Symptom Management/Education Kept in for referenceProvided education re: OT role s/p mTBI to address functional visual system, importance of balancing provocation of symptoms w/ rest and recovery. Discussed nature of mTBI and impact on sympathetic nervous system regulation w/ emphasis on energy conservation technique implementation. Patient verbalizes good understanding of all education provided.         ADL/IADLs

## 2023-10-31 NOTE — PROGRESS NOTES
Occupational Therapy Visit    OT DAILY NOTE FOR OUTPATIENT THERAPY    Patient: Mónica Bates   MRN: [id number]  : 1965 57 y.o.  Referring Physician: Noel Grigsby, Kristina  Date of Visit: 10/31/2023      Certification Dates: 10/25/23 through 24    Diagnosis:   1. Concussion without loss of consciousness, sequela (CMS/[facility])        Chief Complaints:   mTBI    Precautions:  Existing Precautions/Restrictions: cardiac  Precautions comments: Diagnosed w/ SVT and possible A-Fib in 2022 - started on [facility]    TODAY'S VISIT    Time In Session:  Start Time: 805  Stop Time: 900  Time Calculation (min): 55 min   History/Vitals/Pain/Encounter Info - 10/31/23 0817        Injury History/Precautions/Daily Required Info    Document Type initial evaluation     Primary Therapist Gianni Hinojosa, MS OTR/L     Chief Complaint/Reason for Visit  mTBI     Onset of Illness/Injury or Date of Surgery 23     Referring Physician Dr. Noel Grigsby     Existing Precautions/Restrictions cardiac     Precautions comments Diagnosed w/ SVT and possible A-Fib in 2022 - started on [employer]     History of present illness/functional impairment Mónica Bates is a 57 year-old female who is referred to outpatient occupational therapy to address functional visual impairments related to mTBI. On 23, she was kicked in the head (without a helmet) by a horse. She reports a questionable loss of consciousness as she was in and out, indicating possible amnesia to the event, and reports confusion and disorientation 15-20 minutes after the event. She went to the ED immediately following the incident reporting lightheadedness, nausea, left-sided facial pain and scrape on dorsal aspect of left elbow. A CT scan of her head/facial bones was negative for any acute bleeding or fractures but she did have some soft tissue swelling. She returned to the ED on 23 with complaints of left eye visual changes,  "increased left eye pressure and hypertension. Her ocular pressures were elevated between 22-24 but repeat imaging was negative for delayed bleeding. She was instructed to follow-up with her PCP, optometrist and concussion clinic. Her optometrist appointment was unremarkable and her ocular pressures had decreased to 13-14 at that point. She denies any previous concussions. She arrives to OT evaluation wearing a custom-made left achilles ankle brace due to micro-tears from this past January 2023 and she is wearing a right wrist short-arm thumb spica splint due to a minor sprain. She reports three previous right knee surgeries and left hip lateral tear related to injuries from horse-back riding. She was also diagnosed with SVT and possible A-Fib in December 2022 and was started on Metropolol. 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 She reports daily headaches and residual left eye pain and swelling as well as increasing \"lighting illumination\" in left peripheral visual field. She spoke with an optometrist about this who reports no concerns.     Patient/Family/Caregiver Comments/Observations no falls, no new meds, no pain     Patient reported fall since last visit No        Pain Assessment    Currently in pain No/Denies        Pain Interventions    Intervention  monitored throughout session; rest breaks provided     Post Intervention Comments monitor for pain        Pre Activity Vital Signs    Oxygen Therapy None (Room air)         Services    Do You Speak a Language Other Than English at Home? no                Daily Treatment Assessment and Plan - 10/31/23 0817        Daily Treatment Assessment and Plan    Progress toward goals Progressing     Daily Outcome Summary Pt is pleasant and motivated and actively participated in session with focus on edu on HEP, ocular ROM and endurance training.  Pt tolerated " well/no increase in symtpoms     Plan and Recommendations Pt is somewhat higher level - progress visual endurance, clutter, cognition, multi-stimuli tolerance and positional changes to progress to PLOF.                     OBJECTIVE DATA TAKEN TODAY    None Taken    Today's Treatment:    VISION/CONCUSSION OT FLOW SHEET    OT Vision/mTBI  EXERCISES CURRENT SESSION TIME   NEURO RE-ED  CPT 59655 TOTAL TIME FOR SESSION 45 mins   Dynavision 10/31/23 Inner 3 rings;  Trial 1: 86 targets with RT of .70 sec    Inner 4 rings:  Trail 1: 90 targets with RT of .66 sec    Full board:   Trial 1: 85 targets with RT of .70 sec    VTS3/VTS4     CPT     BITs     NVR     Saccadic Fixation 10/31/23 Spot It:  Vertical, horizontal and diagonal saccades to identify same object on 2 cards    Ocular Motor Control     Visual Tracing     3D Tracking     Visual Perception     Convergence/Divergence     Accommodation     Visual Stimulation 10/31/23 Pt challenged with visual clutter worksheets.  Pt to complete at home    THER ACT  CPT 66584 TOTAL TIME FOR SESSION 10 mins   Assessment     Pain, Vitals, Meds, Etc. Assessed/monitored throughout session    HEP 10/31/23:  OT edu Pt on and issued:   Ocular ROM  Level 1 horizontal saccades  Seek and find, match ups   Pt verbalized understanding    Visual Endurance      Work/School Simulation      SELF CARE  CPT 33259 TOTAL TIME FOR SESSION    PCS Symptom Management/Education Kept in for referenceProvided education re: OT role s/p mTBI to address functional visual system, importance of balancing provocation of symptoms w/ rest and recovery. Discussed nature of mTBI and impact on sympathetic nervous system regulation w/ emphasis on energy conservation technique implementation. Patient verbalizes good understanding of all education provided.         ADL/IADLs

## 2023-11-07 ENCOUNTER — HOSPITAL ENCOUNTER (OUTPATIENT)
Dept: OCCUPATIONAL THERAPY | Age: 58
Setting detail: THERAPIES SERIES
Discharge: HOME | End: 2023-11-07
Attending: PHYSICAL MEDICINE & REHABILITATION
Payer: COMMERCIAL

## 2023-11-07 DIAGNOSIS — S06.0X0S CONCUSSION WITHOUT LOSS OF CONSCIOUSNESS, SEQUELA (CMS/HCC): Primary | ICD-10-CM

## 2023-11-07 PROCEDURE — 97112 NEUROMUSCULAR REEDUCATION: CPT | Mod: GO

## 2023-11-07 PROCEDURE — 97530 THERAPEUTIC ACTIVITIES: CPT | Mod: GO

## 2023-11-07 NOTE — OP OT TREATMENT LOG
VISION/CONCUSSION OT FLOW SHEET    OT Vision/mTBI  EXERCISES CURRENT SESSION TIME   NEURO RE-ED  CPT 46850 TOTAL TIME FOR SESSION 45 mins   Dynavision 10/31/23 Inner 3 rings;  Trial 1: 86 targets with RT of .70 sec    Inner 4 rings:  Trail 1: 90 targets with RT of .66 sec    Full board:   Trial 1: 85 targets with RT of .70 sec    VTS3/VTS4     CPT     BITs 11/7/23:  For improved screen tolerance, functional visual component skills  Visual scanning time paced with central fixator  Trial 1: 93% accurate, all 51 hits. Central fixator tended to 8 of 13 62%.  Trial 2: 100% accurate, 54 hits, central fixator 93% accurate 14 of 15 changes    Rotator: on speed 1 c central fixator  Trial 1: 98% accurate all 40 hits  Central fixator 11 changes    Gap sequence: with central fixator  Trail 1: 91% accurate all 40 hits, completed in 2 mins 37  Central fixator: 8 of 10 changes; 80% accurate.    Functional memory: up to 5 words:   Trial 1: 100% 16 trials  Upgrade to 5 words with persistence  Trial 2: 87% accurate, 13 trials     Pt tolerated well      NVR     Saccadic Fixation 10/31/23 Spot It:  Vertical, horizontal and diagonal saccades to identify same object on 2 cards    Ocular Motor Control     Visual Tracing     3D Tracking     Visual Perception     Convergence/Divergence     Accommodation     Visual Stimulation 10/31/23 Pt challenged with visual clutter worksheets.  Pt to complete at home    THER ACT  CPT 88871 TOTAL TIME FOR SESSION 10 mins   Assessment     Pain, Vitals, Meds, Etc. Assessed/monitored throughout session    HEP 11/7/23:  OT reinforced Hep:     Ocular ROM  Level 1 horizontal saccades    Pt verbalized understanding    Visual Endurance      Work/School Simulation      SELF CARE  CPT 72525 TOTAL TIME FOR SESSION    PCS Symptom Management/Education Kept in for referenceProvided education re: OT role s/p mTBI to address functional visual system, importance of balancing provocation of symptoms w/ rest and recovery.  Discussed nature of mTBI and impact on sympathetic nervous system regulation w/ emphasis on energy conservation technique implementation. Patient verbalizes good understanding of all education provided.         ADL/IADLs

## 2023-11-07 NOTE — PROGRESS NOTES
Occupational Therapy Visit    OT DAILY NOTE FOR OUTPATIENT THERAPY    Patient: Mónica Bates   MRN: [id number]  : 1965 58 y.o.  Referring Physician: Noel Grigsby, Kristina  Date of Visit: 2023      Certification Dates: 10/25/23 through 24    Diagnosis:   1. Concussion without loss of consciousness, sequela (CMS/[facility])        Chief Complaints:   mTBI    Precautions:  Existing Precautions/Restrictions: cardiac  Precautions comments: Diagnosed w/ SVT and possible A-Fib in 2022 - started on [facility]    TODAY'S VISIT    Time In Session:  Start Time: 1105  Stop Time: 1200  Time Calculation (min): 55 min   History/Vitals/Pain/Encounter Info - 23 1119        Injury History/Precautions/Daily Required Info    Document Type daily treatment     Primary Therapist Gianni Hinojosa, MS OTR/L     Chief Complaint/Reason for Visit  mTBI     Onset of Illness/Injury or Date of Surgery 23     Referring Physician Dr. Noel Grigsby     Existing Precautions/Restrictions cardiac     Precautions comments Diagnosed w/ SVT and possible A-Fib in 2022 - started on [employer]     History of present illness/functional impairment Mónica Bates is a 57 year-old female who is referred to outpatient occupational therapy to address functional visual impairments related to mTBI. On 23, she was kicked in the head (without a helmet) by a horse. She reports a questionable loss of consciousness as she was in and out, indicating possible amnesia to the event, and reports confusion and disorientation 15-20 minutes after the event. She went to the ED immediately following the incident reporting lightheadedness, nausea, left-sided facial pain and scrape on dorsal aspect of left elbow. A CT scan of her head/facial bones was negative for any acute bleeding or fractures but she did have some soft tissue swelling. She returned to the ED on 23 with complaints of left eye visual changes,  "increased left eye pressure and hypertension. Her ocular pressures were elevated between 22-24 but repeat imaging was negative for delayed bleeding. She was instructed to follow-up with her PCP, optometrist and concussion clinic. Her optometrist appointment was unremarkable and her ocular pressures had decreased to 13-14 at that point. She denies any previous concussions. She arrives to OT evaluation wearing a custom-made left achilles ankle brace due to micro-tears from this past January 2023 and she is wearing a right wrist short-arm thumb spica splint due to a minor sprain. She reports three previous right knee surgeries and left hip lateral tear related to injuries from horse-back riding. She was also diagnosed with SVT and possible A-Fib in December 2022 and was started on Metropolol. 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 She reports daily headaches and residual left eye pain and swelling as well as increasing \"lighting illumination\" in left peripheral visual field. She spoke with an optometrist about this who reports no concerns.     Patient/Family/Caregiver Comments/Observations no falls, Pt reports she is finishing up a step down 5 day prednisone pack, no eye strain, no headache Pt reports some back pain     Patient reported fall since last visit No        Pain Assessment    Currently in pain Yes     Preferred Pain Scale number (Numeric Rating Pain Scale)     Pain: Body location Back     Pain Rating (0-10): Pre Activity 4     Pain Rating (0-10): Activity 4     Pain Rating (0-10): Post Activity 4        Pain Interventions    Intervention  monitor throughtout session. rest breaks provided     Post Intervention Comments monitor for pain        Pre Activity Vital Signs    Oxygen Therapy None (Room air)         Services    Do You Speak a Language Other Than English at Home? no                Daily " Treatment Assessment and Plan - 11/07/23 1119        Daily Treatment Assessment and Plan    Progress toward goals Progressing     Daily Outcome Summary Pt actively participated in session with focus on screen tolerance, functional visual component skills and visual endurance.     Plan and Recommendations Pt is somewhat higher level - progress visual endurance, clutter, cognition, multi-stimuli tolerance and positional changes to progress to PLOF.                     OBJECTIVE DATA TAKEN TODAY    None Taken    Today's Treatment:    VISION/CONCUSSION OT FLOW SHEET    OT Vision/mTBI  EXERCISES CURRENT SESSION TIME   NEURO RE-ED  CPT 46482 TOTAL TIME FOR SESSION 45 mins   Dynavision 10/31/23 Inner 3 rings;  Trial 1: 86 targets with RT of .70 sec    Inner 4 rings:  Trail 1: 90 targets with RT of .66 sec    Full board:   Trial 1: 85 targets with RT of .70 sec    VTS3/VTS4     CPT     BITs 11/7/23:  For improved screen tolerance, functional visual component skills  Visual scanning time paced with central fixator  Trial 1: 93% accurate, all 51 hits. Central fixator tended to 8 of 13 62%.  Trial 2: 100% accurate, 54 hits, central fixator 93% accurate 14 of 15 changes    Rotator: on speed 1 c central fixator  Trial 1: 98% accurate all 40 hits  Central fixator 11 changes    Gap sequence: with central fixator  Trail 1: 91% accurate all 40 hits, completed in 2 mins 37  Central fixator: 8 of 10 changes; 80% accurate.    Functional memory: up to 5 words:   Trial 1: 100% 16 trials  Upgrade to 5 words with persistence  Trial 2: 87% accurate, 13 trials     Pt tolerated well      NVR     Saccadic Fixation 10/31/23 Spot It:  Vertical, horizontal and diagonal saccades to identify same object on 2 cards    Ocular Motor Control     Visual Tracing     3D Tracking     Visual Perception     Convergence/Divergence     Accommodation     Visual Stimulation 10/31/23 Pt challenged with visual clutter worksheets.  Pt to complete at home    THER  ACT  CPT 77745 TOTAL TIME FOR SESSION 10 mins   Assessment     Pain, Vitals, Meds, Etc. Assessed/monitored throughout session    HEP 11/7/23:  OT reinforced Hep:     Ocular ROM  Level 1 horizontal saccades    Pt verbalized understanding    Visual Endurance      Work/School Simulation      SELF CARE  CPT 05695 TOTAL TIME FOR SESSION    PCS Symptom Management/Education Kept in for referenceProvided education re: OT role s/p mTBI to address functional visual system, importance of balancing provocation of symptoms w/ rest and recovery. Discussed nature of mTBI and impact on sympathetic nervous system regulation w/ emphasis on energy conservation technique implementation. Patient verbalizes good understanding of all education provided.         ADL/IADLs

## 2023-11-09 ENCOUNTER — HOSPITAL ENCOUNTER (OUTPATIENT)
Dept: OCCUPATIONAL THERAPY | Age: 58
Setting detail: THERAPIES SERIES
Discharge: HOME | End: 2023-11-09
Attending: PHYSICAL MEDICINE & REHABILITATION
Payer: COMMERCIAL

## 2023-11-09 ENCOUNTER — TELEPHONE (OUTPATIENT)
Dept: REGISTRATION | Age: 58
End: 2023-11-09
Payer: COMMERCIAL

## 2023-11-09 DIAGNOSIS — S06.0X0S CONCUSSION WITHOUT LOSS OF CONSCIOUSNESS, SEQUELA (CMS/HCC): Primary | ICD-10-CM

## 2023-11-09 PROCEDURE — 97530 THERAPEUTIC ACTIVITIES: CPT | Mod: GO

## 2023-11-09 PROCEDURE — 97112 NEUROMUSCULAR REEDUCATION: CPT | Mod: GO

## 2023-11-09 NOTE — PROGRESS NOTES
Occupational Therapy Visit    OT DAILY NOTE FOR OUTPATIENT THERAPY    Patient: Mónica Bates   MRN: [id number]  : 1965 58 y.o.  Referring Physician: Noel Grigsby, Kristina  Date of Visit: 2023      Certification Dates: 10/25/23 through 24    Diagnosis:   1. Concussion without loss of consciousness, sequela (CMS/[facility])        Chief Complaints:   mTBI    Precautions:  Existing Precautions/Restrictions: cardiac  Precautions comments: Diagnosed w/ SVT and possible A-Fib in 2022 - started on [facility]    TODAY'S VISIT    Time In Session:  Start Time: 1652  Stop Time: 1745  Time Calculation (min): 53 min   History/Vitals/Pain/Encounter Info - 23 1708        Injury History/Precautions/Daily Required Info    Document Type daily treatment     Primary Therapist Gianni Hinojosa, MS OTR/L     Chief Complaint/Reason for Visit  mTBI     Onset of Illness/Injury or Date of Surgery 23     Referring Physician Dr. Noel Grigsby     Existing Precautions/Restrictions cardiac     Precautions comments Diagnosed w/ SVT and possible A-Fib in 2022 - started on Metropolol     History of present illness/functional impairment Mónica Bates is a 57 year-old female who is referred to outpatient occupational therapy to address functional visual impairments related to mTBI. On 23, she was kicked in the head (without a helmet) by a horse. She reports a questionable loss of consciousness as she was in and out, indicating possible amnesia to the event, and reports confusion and disorientation 15-20 minutes after the event. She went to the ED immediately following the incident reporting lightheadedness, nausea, left-sided facial pain and scrape on dorsal aspect of left elbow. A CT scan of her head/facial bones was negative for any acute bleeding or fractures but she did have some soft tissue swelling. She returned to the ED on 23 with complaints of left eye visual changes,  "increased left eye pressure and hypertension. Her ocular pressures were elevated between 22-24 but repeat imaging was negative for delayed bleeding. She was instructed to follow-up with her PCP, optometrist and concussion clinic. Her optometrist appointment was unremarkable and her ocular pressures had decreased to 13-14 at that point. She denies any previous concussions. She arrives to OT evaluation wearing a custom-made left achilles ankle brace due to micro-tears from this past January 2023 and she is wearing a right wrist short-arm thumb spica splint due to a minor sprain. She reports three previous right knee surgeries and left hip lateral tear related to injuries from horse-back riding. She was also diagnosed with SVT and possible A-Fib in December 2022 and was started on Metropolol. 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 She reports daily headaches and residual left eye pain and swelling as well as increasing \"lighting illumination\" in left peripheral visual field. She spoke with an optometrist about this who reports no concerns.     Patient/Family/Caregiver Comments/Observations Pt reports fall in horse stall without injury (\"felt like slow motion\") when she tripped on a rubber mat.  no changes in meds.  Pt reports \"head prickle feeling\" and 4 of 10 headache/pressures     Patient reported fall since last visit No        Pain Assessment    Currently in pain Yes     Preferred Pain Scale number (Numeric Rating Pain Scale)     Pain: Body location Head     Pain Rating (0-10): Pre Activity 4     Pain Rating (0-10): Activity 4     Pain Rating (0-10): Post Activity 4        Pain Interventions    Intervention  monitor throughout session, rest breaks provided     Post Intervention Comments montior for pain        Pre Activity Vital Signs    Oxygen Therapy None (Room air)         Services    Do You Speak a " Language Other Than English at Home? no                Daily Treatment Assessment and Plan - 11/09/23 1708        Daily Treatment Assessment and Plan    Progress toward goals Progressing     Daily Outcome Summary Pt actively participated in session with focus on improving visual endurance, divided attention, visual clutter and multi stim tolerance.     Plan and Recommendations Pt is somewhat higher level - progress visual endurance, clutter, cognition, multi-stimuli tolerance and positional changes to progress to PLOF.                     OBJECTIVE DATA TAKEN TODAY    None Taken    Today's Treatment:    VISION/CONCUSSION OT FLOW SHEET    OT Vision/mTBI  EXERCISES CURRENT SESSION TIME   NEURO RE-ED  CPT 08343 TOTAL TIME FOR SESSION 43 mins   Dynavision 11/9/23: Inner 3 rings:   Trail 1: 97 targets with RT of .62 sec    Inner 4 rings:   Trial 1: 89 targets with RT of .67  Targets    Full board: on 5 sec timer  Trial 1: 85 targets with RT of .70 targets    Full board at 2 sec timer with symbols  Trial 1: all 70 targets with RT of .85 sec Pt successfully identified all 10 symbols.     Red/green challenge on 1.25 sec timer  Pt challenged with saying GREEN for all green lights and just hitting red lights  Trial 1: Pt hit 40 of 41 reds with RT of .75 sec, Pt identified 39 of 40 greens with RT of .69 sec    Mode B with symbols and periphery distractions/noodles  Trial 1: 79 targets with RT of .75 sec Pt identified all 10 symbols and did not report any increase in symtpoms        VTS3/VTS4     CPT     BITs 11/7/23:  For improved screen tolerance, functional visual component skills  Visual scanning time paced with central fixator  Trial 1: 93% accurate, all 51 hits. Central fixator tended to 8 of 13 62%.  Trial 2: 100% accurate, 54 hits, central fixator 93% accurate 14 of 15 changes    Rotator: on speed 1 c central fixator  Trial 1: 98% accurate all 40 hits  Central fixator 11 changes    Gap sequence: with central  fixator  Trail 1: 91% accurate all 40 hits, completed in 2 mins 37  Central fixator: 8 of 10 changes; 80% accurate.    Functional memory: up to 5 words:   Trial 1: 100% 16 trials  Upgrade to 5 words with persistence  Trial 2: 87% accurate, 13 trials     Pt tolerated well      NVR     Saccadic Fixation 10/31/23 Spot It:  Vertical, horizontal and diagonal saccades to identify same object on 2 cards    Ocular Motor Control     Visual Tracing     3D Tracking     Visual Perception     Convergence/Divergence     Accommodation     Visual Stimulation 11/9/23 Pt challenged with logic/deductive reasoning activity:  Costume Party.  Pt issued another logic deductive reasoning worksheet to complete at home as part of HEP.       THER ACT  CPT 43963 TOTAL TIME FOR SESSION 10 mins   Assessment     Pain, Vitals, Meds, Etc. Assessed/monitored throughout session    HEP 11/9/23:  OT intro and issued:  Level 2 horizontal saccades, Level 1 tracing, logic worksheet and picture sleuth Pt has to scan 4 boxes and identify 3 objects in all 4 boxes.   OT reinforced Hep:     Ocular ROM  Level 1 horizontal saccades    Pt verbalized understanding    Visual Endurance      Work/School Simulation      SELF CARE  CPT 32079 TOTAL TIME FOR SESSION    PCS Symptom Management/Education Kept in for referenceProvided education re: OT role s/p mTBI to address functional visual system, importance of balancing provocation of symptoms w/ rest and recovery. Discussed nature of mTBI and impact on sympathetic nervous system regulation w/ emphasis on energy conservation technique implementation. Patient verbalizes good understanding of all education provided.         ADL/IADLs

## 2023-11-09 NOTE — TELEPHONE ENCOUNTER
Hello,    This message is to offer a 5pm OT appointment. If you would like to schedule please call [phone].    Thank you,

## 2023-11-09 NOTE — OP OT TREATMENT LOG
VISION/CONCUSSION OT FLOW SHEET    OT Vision/mTBI  EXERCISES CURRENT SESSION TIME   NEURO RE-ED  CPT 56184 TOTAL TIME FOR SESSION 43 mins   D[employer] 11/9/23: Inner 3 rings:   Trail 1: 97 targets with RT of .62 sec    Inner 4 rings:   Trial 1: 89 targets with RT of .67  Targets    Full board: on 5 sec timer  Trial 1: 85 targets with RT of .70 targets    Full board at 2 sec timer with symbols  Trial 1: all 70 targets with RT of .85 sec Pt successfully identified all 10 symbols.     Red/green challenge on 1.25 sec timer  Pt challenged with saying GREEN for all green lights and just hitting red lights  Trial 1: Pt hit 40 of 41 reds with RT of .75 sec, Pt identified 39 of 40 greens with RT of .69 sec    Mode B with symbols and periphery distractions/noodles  Trial 1: 79 targets with RT of .75 sec Pt identified all 10 symbols and did not report any increase in symtpoms        VTS3/VTS4     CPT     BITs 11/7/23:  For improved screen tolerance, functional visual component skills  Visual scanning time paced with central fixator  Trial 1: 93% accurate, all 51 hits. Central fixator tended to 8 of 13 62%.  Trial 2: 100% accurate, 54 hits, central fixator 93% accurate 14 of 15 changes    Rotator: on speed 1 c central fixator  Trial 1: 98% accurate all 40 hits  Central fixator 11 changes    Gap sequence: with central fixator  Trail 1: 91% accurate all 40 hits, completed in 2 mins 37  Central fixator: 8 of 10 changes; 80% accurate.    Functional memory: up to 5 words:   Trial 1: 100% 16 trials  Upgrade to 5 words with persistence  Trial 2: 87% accurate, 13 trials     Pt tolerated well      NVR     Saccadic Fixation 10/31/23 Spot It:  Vertical, horizontal and diagonal saccades to identify same object on 2 cards    Ocular Motor Control     Visual Tracing     3D Tracking     Visual Perception     Convergence/Divergence     Accommodation     Visual Stimulation 11/9/23 Pt challenged with logic/deductive reasoning activity:  Costume  Party.  Pt issued another logic deductive reasoning worksheet to complete at home as part of HEP.       THER ACT  CPT 68617 TOTAL TIME FOR SESSION 10 mins   Assessment     Pain, Vitals, Meds, Etc. Assessed/monitored throughout session    HEP 11/9/23:  OT intro and issued:  Level 2 horizontal saccades, Level 1 tracing, logic worksheet and picture sleuth Pt has to scan 4 boxes and identify 3 objects in all 4 boxes.   OT reinforced Hep:     Ocular ROM  Level 1 horizontal saccades    Pt verbalized understanding    Visual Endurance      Work/School Simulation      SELF CARE  CPT 90044 TOTAL TIME FOR SESSION    PCS Symptom Management/Education Kept in for referenceProvided education re: OT role s/p mTBI to address functional visual system, importance of balancing provocation of symptoms w/ rest and recovery. Discussed nature of mTBI and impact on sympathetic nervous system regulation w/ emphasis on energy conservation technique implementation. Patient verbalizes good understanding of all education provided.         ADL/IADLs

## 2023-11-16 ENCOUNTER — HOSPITAL ENCOUNTER (OUTPATIENT)
Dept: OCCUPATIONAL THERAPY | Age: 58
Setting detail: THERAPIES SERIES
Discharge: HOME | End: 2023-11-16
Attending: PHYSICAL MEDICINE & REHABILITATION
Payer: COMMERCIAL

## 2023-11-16 DIAGNOSIS — S06.0X0S CONCUSSION WITHOUT LOSS OF CONSCIOUSNESS, SEQUELA (CMS/HCC): Primary | ICD-10-CM

## 2023-11-16 PROCEDURE — 97112 NEUROMUSCULAR REEDUCATION: CPT | Mod: GO

## 2023-11-16 PROCEDURE — 97530 THERAPEUTIC ACTIVITIES: CPT | Mod: GO

## 2023-11-16 NOTE — PROGRESS NOTES
Occupational Therapy Visit    OT DAILY NOTE FOR OUTPATIENT THERAPY    Patient: Mónica Bates   MRN: [id number]  : 1965 58 y.o.  Referring Physician: Noel Grigsby, Kristina  Date of Visit: 2023      Certification Dates: 10/25/23 through 24    Diagnosis:   1. Concussion without loss of consciousness, sequela (CMS/HCC)        Chief Complaints:   mTBI    Precautions:  Existing Precautions/Restrictions: cardiac  Precautions comments: Diagnosed w/ SVT and possible A-Fib in 2022 - started on [facility]    TODAY'S VISIT    Time In Session:  Start Time: 1220 (OT forgot session was scheduled at 12 noon (normally lunch time) Pt willing to stay to get full session.)  Stop Time: 1313  Time Calculation (min): 53 min   History/Vitals/Pain/Encounter Info - 23 1228        Injury History/Precautions/Daily Required Info    Document Type daily treatment     Primary Therapist Gianni Hinojosa, MS OTR/L     Chief Complaint/Reason for Visit  mTBI     Onset of Illness/Injury or Date of Surgery 23     Referring Physician Dr. Noel Grigsby     Existing Precautions/Restrictions cardiac     Precautions comments Diagnosed w/ SVT and possible A-Fib in 2022 - started on [employer]     History of present illness/functional impairment Mónica Bates is a 57 year-old female who is referred to outpatient occupational therapy to address functional visual impairments related to mTBI. On 23, she was kicked in the head (without a helmet) by a horse. She reports a questionable loss of consciousness as she was in and out, indicating possible amnesia to the event, and reports confusion and disorientation 15-20 minutes after the event. She went to the ED immediately following the incident reporting lightheadedness, nausea, left-sided facial pain and scrape on dorsal aspect of left elbow. A CT scan of her head/facial bones was negative for any acute bleeding or fractures but she did have  "some soft tissue swelling. She returned to the ED on 9/29/23 with complaints of left eye visual changes, increased left eye pressure and hypertension. Her ocular pressures were elevated between 22-24 but repeat imaging was negative for delayed bleeding. She was instructed to follow-up with her PCP, optometrist and concussion clinic. Her optometrist appointment was unremarkable and her ocular pressures had decreased to 13-14 at that point. She denies any previous concussions. She arrives to OT evaluation wearing a custom-made left achilles ankle brace due to micro-tears from this past January 2023 and she is wearing a right wrist short-arm thumb spica splint due to a minor sprain. She reports three previous right knee surgeries and left hip lateral tear related to injuries from horse-back riding. She was also diagnosed with SVT and possible A-Fib in December 2022 and was started on [facility]. 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 She reports daily headaches and residual left eye pain and swelling as well as increasing \"lighting illumination\" in left peripheral visual field. She spoke with an optometrist about this who reports no concerns.     Patient/Family/Caregiver Comments/Observations no falls, no changes in meds, L side head pain behind eye brow 3 of 10     Patient reported fall since last visit No        Pain Assessment    Currently in pain Yes     Preferred Pain Scale number (Numeric Rating Pain Scale)     Pain Side/Orientation left     Pain: Body location Eye     Pain Rating (0-10): Pre Activity 3     Pain Rating (0-10): Activity 3     Pain Rating (0-10): Post Activity 3        Pain Interventions    Intervention  monitor throughtout session, rest breaks provided     Post Intervention Comments monitor for pain        Pre Activity Vital Signs    Oxygen Therapy None (Room air)         Services    " Do You Speak a Language Other Than English at Home? no                Daily Treatment Assessment and Plan - 11/16/23 1410        Daily Treatment Assessment and Plan    Progress toward goals Progressing     Daily Outcome Summary Pt is pleasant and actively participated in session with focus on tolerating endurance activities, divided attention, clutter and ocular ROM.  Pt reports dec headache pain above L eye brow to between 2 and 3.     Plan and Recommendations Pt is somewhat higher level - progress visual endurance, clutter, cognition, multi-stimuli tolerance and positional changes to progress to PLOF.                     OBJECTIVE DATA TAKEN TODAY    None Taken    Today's Treatment:    VISION/CONCUSSION OT FLOW SHEET    OT Vision/mTBI  EXERCISES CURRENT SESSION TIME   NEURO RE-ED  CPT 12618 TOTAL TIME FOR SESSION 43 mins   Dyn[employer]ision 11/16/23 2 minute challenge  Trial 1: 163 targets with RT of .73 sec    3 minute challenge with trivia distraction:  Trial 1: 232 of 233 with trivia distraction with  RT of .76 sec    11/9/23: Inner 3 rings:   Trail 1: 97 targets with RT of .62 sec    Inner 4 rings:   Trial 1: 89 targets with RT of .67  Targets    Full board: on 5 sec timer  Trial 1: 85 targets with RT of .70 targets    Full board at 2 sec timer with symbols  Trial 1: all 70 targets with RT of .85 sec Pt successfully identified all 10 symbols.     Red/green challenge on 1.25 sec timer  Pt challenged with saying GREEN for all green lights and just hitting red lights  Trial 1: Pt hit 40 of 41 reds with RT of .75 sec, Pt identified 39 of 40 greens with RT of .69 sec    Mode B with symbols and periphery distractions/noodles  Trial 1: 79 targets with RT of .75 sec Pt identified all 10 symbols and did not report any increase in symtpoms        VTS3/VTS4     CPT     BITs 11/7/23:  For improved screen tolerance, functional visual component skills  Visual scanning time paced with central fixator  Trial 1: 93% accurate, all 51  hits. Central fixator tended to 8 of 13 62%.  Trial 2: 100% accurate, 54 hits, central fixator 93% accurate 14 of 15 changes    Rotator: on speed 1 c central fixator  Trial 1: 98% accurate all 40 hits  Central fixator 11 changes    Gap sequence: with central fixator  Trail 1: 91% accurate all 40 hits, completed in 2 mins 37  Central fixator: 8 of 10 changes; 80% accurate.    Functional memory: up to 5 words:   Trial 1: 100% 16 trials  Upgrade to 5 words with persistence  Trial 2: 87% accurate, 13 trials     Pt tolerated well      NVR     Saccadic Fixation 10/31/23 Spot It:  Vertical, horizontal and diagonal saccades to identify same object on 2 cards    Ocular Motor Control 11/16/23 Word around: Pt completed 6 cards using ocular ROM.  Pt reports    Visual Tracing     3D Tracking     Visual Perception     Convergence/Divergence     Accommodation     Visual Stimulation 11/16/23 Pt challenged with Pix [employer] visual clutter activity.  Pt challenged with 4 folders with 6 items in each.        THER ACT  CPT 75570 TOTAL TIME FOR SESSION 10 mins   Assessment     Pain, Vitals, Meds, Etc. Assessed/monitored throughout session    HEP 11/16/23:  OT reinforced:   Level 2 horizontal saccades, Level 1 tracing, logic worksheet and picture sleuth Pt has to scan 4 boxes and identify 3 objects in all 4 boxes.   OT reinforced Hep:     Ocular ROM  Level 1 horizontal saccades    Pt verbalized understanding    Visual Endurance      Work/School Simulation      SELF CARE  CPT 26081 TOTAL TIME FOR SESSION    PCS Symptom Management/Education Kept in for referenceProvided education re: OT role s/p mTBI to address functional visual system, importance of balancing provocation of symptoms w/ rest and recovery. Discussed nature of mTBI and impact on sympathetic nervous system regulation w/ emphasis on energy conservation technique implementation. Patient verbalizes good understanding of all education provided.         ADL/IADLs

## 2023-11-16 NOTE — OP OT TREATMENT LOG
VISION/CONCUSSION OT FLOW SHEET    OT Vision/mTBI  EXERCISES CURRENT SESSION TIME   NEURO RE-ED  CPT 88496 TOTAL TIME FOR SESSION 43 mins   Dynavision 11/16/23 2 minute challenge  Trial 1: 163 targets with RT of .73 sec    3 minute challenge with trivia distraction:  Trial 1: 232 of 233 with trivia distraction with  RT of .76 sec    11/9/23: Inner 3 rings:   Trail 1: 97 targets with RT of .62 sec    Inner 4 rings:   Trial 1: 89 targets with RT of .67  Targets    Full board: on 5 sec timer  Trial 1: 85 targets with RT of .70 targets    Full board at 2 sec timer with symbols  Trial 1: all 70 targets with RT of .85 sec Pt successfully identified all 10 symbols.     Red/green challenge on 1.25 sec timer  Pt challenged with saying GREEN for all green lights and just hitting red lights  Trial 1: Pt hit 40 of 41 reds with RT of .75 sec, Pt identified 39 of 40 greens with RT of .69 sec    Mode B with symbols and periphery distractions/noodles  Trial 1: 79 targets with RT of .75 sec Pt identified all 10 symbols and did not report any increase in symtpoms        VTS3/VTS4     CPT     BITs 11/7/23:  For improved screen tolerance, functional visual component skills  Visual scanning time paced with central fixator  Trial 1: 93% accurate, all 51 hits. Central fixator tended to 8 of 13 62%.  Trial 2: 100% accurate, 54 hits, central fixator 93% accurate 14 of 15 changes    Rotator: on speed 1 c central fixator  Trial 1: 98% accurate all 40 hits  Central fixator 11 changes    Gap sequence: with central fixator  Trail 1: 91% accurate all 40 hits, completed in 2 mins 37  Central fixator: 8 of 10 changes; 80% accurate.    Functional memory: up to 5 words:   Trial 1: 100% 16 trials  Upgrade to 5 words with persistence  Trial 2: 87% accurate, 13 trials     Pt tolerated well      NVR     Saccadic Fixation 10/31/23 Spot It:  Vertical, horizontal and diagonal saccades to identify same object on 2 cards    Ocular Motor Control 11/16/23 Word  around: Pt completed 6 cards using ocular ROM.  Pt reports    Visual Tracing     3D Tracking     Visual Perception     Convergence/Divergence     Accommodation     Visual Stimulation 11/16/23 Pt challenged with Pix Mix visual clutter activity.  Pt challenged with 4 folders with 6 items in each.        THER ACT  CPT 35726 TOTAL TIME FOR SESSION 10 mins   Assessment     Pain, Vitals, Meds, Etc. Assessed/monitored throughout session    HEP 11/16/23:  OT reinforced:   Level 2 horizontal saccades, Level 1 tracing, logic worksheet and picture sleuth Pt has to scan 4 boxes and identify 3 objects in all 4 boxes.   OT reinforced Hep:     Ocular ROM  Level 1 horizontal saccades    Pt verbalized understanding    Visual Endurance      Work/School Simulation      SELF CARE  CPT 25618 TOTAL TIME FOR SESSION    PCS Symptom Management/Education Kept in for referenceProvided education re: OT role s/p mTBI to address functional visual system, importance of balancing provocation of symptoms w/ rest and recovery. Discussed nature of mTBI and impact on sympathetic nervous system regulation w/ emphasis on energy conservation technique implementation. Patient verbalizes good understanding of all education provided.         ADL/IADLs

## 2023-12-05 ENCOUNTER — TELEPHONE (OUTPATIENT)
Dept: REGISTRATION | Age: 58
End: 2023-12-05
Payer: COMMERCIAL

## 2023-12-05 NOTE — TELEPHONE ENCOUNTER
Hello,    We need to change the time of your appointment on 12/14. Please call to reschedule this appointment at [phone].    Thank you,

## 2023-12-19 ENCOUNTER — HOSPITAL ENCOUNTER (OUTPATIENT)
Dept: OCCUPATIONAL THERAPY | Age: 58
Setting detail: THERAPIES SERIES
Discharge: HOME | End: 2023-12-19
Attending: PHYSICAL MEDICINE & REHABILITATION
Payer: COMMERCIAL

## 2023-12-19 DIAGNOSIS — S06.0X0S CONCUSSION WITHOUT LOSS OF CONSCIOUSNESS, SEQUELA (CMS/HCC): Primary | ICD-10-CM

## 2023-12-19 PROCEDURE — 97112 NEUROMUSCULAR REEDUCATION: CPT | Mod: GO

## 2023-12-19 PROCEDURE — 97530 THERAPEUTIC ACTIVITIES: CPT | Mod: GO

## 2023-12-19 NOTE — OP OT TREATMENT LOG
VISION/CONCUSSION OT FLOW SHEET     OT Vision/mTBI  EXERCISES CURRENT SESSION TIME   NEURO RE-ED  CPT 31097 TOTAL TIME FOR SESSION 43 mins   [employer] 12/19/23 assessment for discharge note    11/16/23 2 minute challenge  Trial 1: 163 targets with RT of .73 sec     3 minute challenge with trivia distraction:  Trial 1: 232 of 233 with trivia distraction with  RT of .76 sec     11/9/23: Inner 3 rings:   Trail 1: 97 targets with RT of .62 sec     Inner 4 rings:   Trial 1: 89 targets with RT of .67  Targets     Full board: on 5 sec timer  Trial 1: 85 targets with RT of .70 targets     Full board at 2 sec timer with symbols  Trial 1: all 70 targets with RT of .85 sec Pt successfully identified all 10 symbols.      Red/green challenge on 1.25 sec timer  Pt challenged with saying GREEN for all green lights and just hitting red lights  Trial 1: Pt hit 40 of 41 reds with RT of .75 sec, Pt identified 39 of 40 greens with RT of .69 sec     Mode B with symbols and periphery distractions/noodles  Trial 1: 79 targets with RT of .75 sec Pt identified all 10 symbols and did not report any increase in symtpoms           VTS3/VTS4       CPT       BITs 11/7/23:  For improved screen tolerance, functional visual component skills  Visual scanning time paced with central fixator  Trial 1: 93% accurate, all 51 hits. Central fixator tended to 8 of 13 62%.  Trial 2: 100% accurate, 54 hits, central fixator 93% accurate 14 of 15 changes     Rotator: on speed 1 c central fixator  Trial 1: 98% accurate all 40 hits  Central fixator 11 changes     Gap sequence: with central fixator  Trail 1: 91% accurate all 40 hits, completed in 2 mins 37  Central fixator: 8 of 10 changes; 80% accurate.     Functional memory: up to 5 words:   Trial 1: 100% 16 trials  Upgrade to 5 words with persistence  Trial 2: 87% accurate, 13 trials      Pt tolerated well        NVR       Saccadic Fixation 10/31/23 Spot It:  Vertical, horizontal and diagonal saccades to  "identify same object on 2 cards     Ocular Motor Control 11/16/23 Word around: Pt completed 6 cards using ocular ROM.  Pt reports     Visual Tracing       3D Tracking       Visual Perception       Convergence/Divergence       Accommodation       Visual Stimulation 12/19/23 Pt challenged with Seek and Find \"Very Entertaining\"        THER ACT  CPT 73339 TOTAL TIME FOR SESSION 10 mins   Assessment       Pain, Vitals, Meds, Etc. Assessed/monitored throughout session     HEP 12/19/23:  OT reinforced:   Level 2 horizontal saccades, Level 1 tracing, logic worksheet and picture sleuth Pt has to scan 4 boxes and identify 3 objects in all 4 boxes.   OT reinforced Hep:     Ocular ROM  Level 1 horizontal saccades     Pt verbalized understanding     Visual Endurance        Work/School Simulation        SELF CARE  CPT 87119 TOTAL TIME FOR SESSION     PCS Symptom Management/Education Kept in for referenceProvided education re: OT role s/p mTBI to address functional visual system, importance of balancing provocation of symptoms w/ rest and recovery. Discussed nature of mTBI and impact on sympathetic nervous system regulation w/ emphasis on energy conservation technique implementation. Patient verbalizes good understanding of all education provided.            ADL/IADLs                                                                         "

## 2023-12-20 NOTE — ADDENDUM NOTE
Encounter addended by: Gianni Hinojosa, OT on: 12/20/2023 10:06 AM   Actions taken: Episode resolved, Patient Goal completed

## 2023-12-20 NOTE — PROGRESS NOTES
Occupational Therapy Discharge    OT DISCHARGE NOTE FOR OUTPATIENT THERAPY    Patient: Mónica Bates   MRN: [id number]  : 1965 58 y.o.  Referring Physician: Noel Grigsby, Kristina  Date of Visit: 2023      Certification Dates:  10/25/23 through 24    Total Visit Count: 6    Diagnosis:   1. Concussion without loss of consciousness, sequela (CMS/HCC)        Chief Complaints:   No chief complaint on file.      Precautions:   Existing Precautions/Restrictions: cardiac  Precautions comments: Diagnosed w/ SVT and possible A-Fib in 2022 - started on Metropolol    TODAY'S VISIT:    Time In Session:  Start Time: 1310  Stop Time: 1403  Time Calculation (min): 53 min   General Information - 23 1313        Session Details    Document Type discharge evaluation     Mode of Treatment individual therapy        General Information    Onset of Illness/Injury or Date of Surgery 23     Referring Physician Dr. Noel Grigsby     History of present illness/functional impairment Mónica Bates is a 57 year-old female who is referred to outpatient occupational therapy to address functional visual impairments related to mTBI. On 23, she was kicked in the head (without a helmet) by a horse. She reports a questionable loss of consciousness as she was “in and out”, indicating possible amnesia to the event, and reports confusion and disorientation 15-20 minutes after the event. She went to the ED immediately following the incident reporting lightheadedness, nausea, left-sided facial pain and scrape on dorsal aspect of left elbow. A CT scan of her head/facial bones was negative for any acute bleeding or fractures but she did have some soft tissue swelling. She returned to the ED on 23 with complaints of left eye visual changes, increased left eye pressure and hypertension. Her ocular pressures were elevated between 22-24 but repeat imaging was negative for delayed bleeding. She  "was instructed to follow-up with her PCP, optometrist and concussion clinic. Her optometrist appointment was unremarkable and her ocular pressures had decreased to 13-14 at that point. She denies any previous concussions. She arrives to OT evaluation wearing a custom-made left achilles ankle brace due to micro-tears from this past January 2023 and she is wearing a right wrist short-arm thumb spica splint due to a minor sprain. She reports three previous right knee surgeries and left hip lateral tear related to injuries from horse-back riding. She was also diagnosed with SVT and possible A-Fib in December 2022 and was started on Metropolol. 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 She reports daily headaches and residual left eye pain and swelling as well as increasing \"lighting illumination\" in left peripheral visual field. She spoke with an optometrist about this who reports no concerns.     Patient/Family/Caregiver Comments/Observations no falls, no new meds/Pt took claratin , 2 of 10 headache from sinus     Existing Precautions/Restrictions cardiac     Precautions comments Diagnosed w/ SVT and possible A-Fib in December 2022 - started on Metropolol         Services    Do You Speak a Language Other Than English at Home? no        Behavioral Health Related Communication    Suicidal Ideation No                Daily Falls Screen - 12/19/23 1313        Daily Falls Assessment    Patient reported fall since last visit No                Pain/Vitals - 12/19/23 1313        Pain Assessment    Currently in pain Yes     Preferred Pain Scale number (Numeric Rating Pain Scale)     Pain: Body location Head     Pain Rating (0-10): Pre Activity 2     Pain Rating (0-10): Activity 2     Pain Rating (0-10): Post Activity 2        Pre Activity Vital Signs    Oxygen Therapy None (Room air)        Pain Interventions    " Intervention  monitor throughout session     Post Intervention Comments monitor for pain                OT - 12/19/23 1313        Occupational Therapy Encounter Type Details    Occupational Therapy Specialty MTBI OT        OT Frequency and Duration    Frequency of treatment 2 times/week     OT Duration --   10 weeks    OT Custom Frequency and Duration 2x/week for 8 weeks - will request plan of care for 10 weeks to allow for scheduling (plans to leave state in November)     OT Cert From 10/25/23     OT Cert To 01/03/24     Date OT POC was sent to provider 10/25/23     Signed OT Plan of Care received?  Yes                Assessment - 12/20/23 0935        Assessment    Plan of Care reviewed and patient/family in agreement Yes     System Pathology/Pathophysiology Noted neuromuscular     Rehab Potential/Prognosis: Occupational Therapy good, to achieve stated therapy goals     Clinical Assessment DISHCHARGE NOTE:  Ms. Bates has met all goals and is now discharged from OT.  Pt is in agreement with this discharge and verbalized understanding.     Plan and Recommendations Pt is now discharged from OT     Planned Services CPT 51164 Self-care/Home management training;CPT 13121 Therapeutic activities;CPT 09916 Therapeutic exercises;CPT 21944 Hot/Cold Packs therapy;CPT 51545 Neuromuscular Reeducation     Comments/Additional Services BITs, Dynavision, UBE                 OBJECTIVE MEASUREMENTS/DATA:    Outcome Measures     Vision     Vision - 12/19/23 1313        Oculomotor Control    Left eye assessed? Yes     Right eye assessed? Yes     Superior assessed? Yes     Inferior assessed? Yes     Diagonal assessed? Yes     Circular assessed? Yes     Left AROM without target ROM Intact     Left oculomotor AROM Discomfort Resolved     Left gaze fixation (10 second hold) Able      Right AROM without target ROM Intact      Right oculomotor AROM Discomfort Mild     Right gaze fixation (10 second hold) Able     Superior AROM without  target ROM Intact      Superior oculomotor AROM Discomfort Mild     Superior gaze fixation (10 second hold) Able     Inferior AROM without target ROM Intact     Inferior oculomotor AROM Discomfort Mild     Inferior gaze fixation (10 second hold) Able     Diagonal AROM without target ROM Intact     Diagonal oculomotor AROM Discomfort Resolved     Circular AROM without target ROM Intact     Circular oculomotor AROM Discomfort Mild   on top of L eye mostly - above eye brow       Eye Teaming    Convergence Intact     Divergence Intact     Accommodation Intact     Smooth Pursuits Intact     3D Tracking Intact     Nystagmus No        Saccadic Fixation Speed    Vertical Saccadic Fixation WFL     Horizontal Saccadic Fixation WFL     Horizontal Saccades H1 4.3 Seconds     Horizontal Saccades H2 4.14 Seconds     Horizontal Saccades H3 4.1 Seconds     Horizontal Saccades H4 4.1 Seconds     Horizontal Saccades trials average 4.16 Seconds     Vertical Saccades V1 4.67 Seconds     Vertical Saccades V2 3.9 Seconds     Vertical Saccades V3 4.47 Seconds     Vertical Saccades V4 3.97 Seconds     Vertical Saccades trials average 4.25 Seconds     Pankaj Devick Test #1 (seconds) 17 Seconds     Pankaj Devick Test #2 (seconds) 13.8 Seconds     Pankaj Devick Test #3 (seconds) 16.3 Seconds     Pankaj Devick Total Time 47.1 Seconds        Visual Reaction Timing    Dynavision Score (Mode B, 5 sec light timer) Targets 87     Dynavision Score Avg response time 0.69 Seconds     Dynavision Score Overall WFL        Symptoms and Outcome Measures    Symptoms Headache;Sleep disturbance     [facility] 7 out of 64                   Outcome Measures        10/25/2023    10:50 12/19/2023    13:13   OT OBJECTIVE Outcome Measures   Pankaj Devick Total Time 47.21 Seconds 47.1 Seconds   [place] Total Errors 10    Dynavision - Targets 82 87   Dynavision - Seconds 0.72 Seconds 0.69 Seconds   Dynavision - Impression Impaired       Will set LTG for 0.65' WFL   OT  SUBJECTIVE Outcome Measures   [facility] 21/64 7 out of 64       Today's Treatment:     Education provided:  Yes: See treatment log for details of education provided  Methods: Discussion and Handout  Readiness: acceptance  Response: Verbalizes understanding    VISION/CONCUSSION OT FLOW SHEET     OT Vision/mTBI  EXERCISES CURRENT SESSION TIME   NEURO RE-ED  CPT 47814 TOTAL TIME FOR SESSION 43 mins   Dynavision 12/19/23 assessment for discharge note    11/16/23 2 minute challenge  Trial 1: 163 targets with RT of .73 sec     3 minute challenge with trivia distraction:  Trial 1: 232 of 233 with trivia distraction with  RT of .76 sec     11/9/23: Inner 3 rings:   Trail 1: 97 targets with RT of .62 sec     Inner 4 rings:   Trial 1: 89 targets with RT of .67  Targets     Full board: on 5 sec timer  Trial 1: 85 targets with RT of .70 targets     Full board at 2 sec timer with symbols  Trial 1: all 70 targets with RT of .85 sec Pt successfully identified all 10 symbols.      Red/green challenge on 1.25 sec timer  Pt challenged with saying GREEN for all green lights and just hitting red lights  Trial 1: Pt hit 40 of 41 reds with RT of .75 sec, Pt identified 39 of 40 greens with RT of .69 sec     Mode B with symbols and periphery distractions/noodles  Trial 1: 79 targets with RT of .75 sec Pt identified all 10 symbols and did not report any increase in symtpoms           VTS3/VTS4       CPT       BITs 11/7/23:  For improved screen tolerance, functional visual component skills  Visual scanning time paced with central fixator  Trial 1: 93% accurate, all 51 hits. Central fixator tended to 8 of 13 62%.  Trial 2: 100% accurate, 54 hits, central fixator 93% accurate 14 of 15 changes     Rotator: on speed 1 c central fixator  Trial 1: 98% accurate all 40 hits  Central fixator 11 changes     Gap sequence: with central fixator  Trail 1: 91% accurate all 40 hits, completed in 2 mins 37  Central fixator: 8 of 10 changes; 80%  "accurate.     Functional memory: up to 5 words:   Trial 1: 100% 16 trials  Upgrade to 5 words with persistence  Trial 2: 87% accurate, 13 trials      Pt tolerated well        NVR       Saccadic Fixation 10/31/23 Spot It:  Vertical, horizontal and diagonal saccades to identify same object on 2 cards     Ocular Motor Control 11/16/23 Word around: Pt completed 6 cards using ocular ROM.  Pt reports     Visual Tracing       3D Tracking       Visual Perception       Convergence/Divergence       Accommodation       Visual Stimulation 12/19/23 Pt challenged with Seek and Find \"Very Entertaining\"        THER ACT  CPT 01633 TOTAL TIME FOR SESSION 10 mins   Assessment       Pain, Vitals, Meds, Etc. Assessed/monitored throughout session     HEP 12/19/23:  OT reinforced:   Level 2 horizontal saccades, Level 1 tracing, logic worksheet and picture sleuth Pt has to scan 4 boxes and identify 3 objects in all 4 boxes.   OT reinforced Hep:     Ocular ROM  Level 1 horizontal saccades     Pt verbalized understanding     Visual Endurance        Work/School Simulation        SELF CARE  CPT 15623 TOTAL TIME FOR SESSION     PCS Symptom Management/Education Kept in for referenceProvided education re: OT role s/p mTBI to address functional visual system, importance of balancing provocation of symptoms w/ rest and recovery. Discussed nature of mTBI and impact on sympathetic nervous system regulation w/ emphasis on energy conservation technique implementation. Patient verbalizes good understanding of all education provided.            ADL/IADLs                                                                              Goals Addressed                    This Visit's Progress       Patient Stated    •  Patient stated... (pt-stated)         Patient stated..     \"I love to read. I need to get back to tolerating reading.\"    12/19/23 Pt reports return to reading \"I'm reading every day\".  MET GOAL         Other    •  Mutually agreed upon pain goal  " "       Mutually agreed upon pain goal: Patient will report headache severity at less than a 2/10 on a daily basis.   12/19/23 Pt reports having 2 of less headache daily and reports \"headaches recently have been more associated with recent sinus infection\".   MET GOAL      •  OT - mTBI (October 2023)         Short Term Goals Time Frame Result Comment   Full ocular motor range of motion and control with mild symptoms for fixation >/= 10 seconds in multi-directional planes  4 weeks MET    Convergence less than or equal to 6 inches for near-focus tasks of reading and computer use with minimal symptoms 4 weeks MET 12/19/23 OT amended goal.    IE = 5\"   Visual-motor reaction time </= 0.70 seconds via Dynavision 5 Second Red assessment with minimal  symptoms to maximize safety with driving.  4 weeks MET IE = 0.72'   Saccadic fixation speed of </= 5.5 seconds as measured by short horizontal saccade testing  4 weeks  MET IE = 6.09'    Patient will perform IADLs and community activities with mild-moderate symptoms as evidenced by a 10-point reduction on the [facility] [facility] Concussion Symptoms Questionnaire By discharge MET IE = 21/64   Patient will increase tolerance for leisurely reading on paper to > 60 minutes with absent or manageable symptoms and use of reading glasses as needed.  By discharge  MET    Patient will increase visual-motor reaction time to < 0.69 seconds via Dynavision 5 Second Red assessment to maximize safety and response time during horse care.  By discharge MET IE = 0.72'    Patient will report ability to complete >/= 5 hours of horse care in multi-stimulating gym with absent or manageable symptoms. By discharge MET    Patient will be independent with visual home exercise program.  By discharge  MET                    Discharge information for CARF:    CARF documentation:    Discharge reason:   Goals met    Increased independence:   [place] in HEP  Increased functional activity  Increased functional " independence    Increased productive assessment:   Increased community independence  Return to household activities  Return to participation in hobbies/leisure pursuits  Return to driving    Hospitalization during treatment:     No    Interrupted for medical reason:    No    Skin integrity:       Unable to assess

## 2024-05-20 ENCOUNTER — APPOINTMENT (OUTPATIENT)
Dept: URBAN - METROPOLITAN AREA CLINIC 198 | Age: 59
Setting detail: DERMATOLOGY
End: 2024-05-20

## 2024-05-20 DIAGNOSIS — D18.0 HEMANGIOMA: ICD-10-CM

## 2024-05-20 DIAGNOSIS — L81.4 OTHER MELANIN HYPERPIGMENTATION: ICD-10-CM

## 2024-05-20 DIAGNOSIS — L40.0 PSORIASIS VULGARIS: ICD-10-CM

## 2024-05-20 DIAGNOSIS — L91.8 OTHER HYPERTROPHIC DISORDERS OF THE SKIN: ICD-10-CM

## 2024-05-20 DIAGNOSIS — D22 MELANOCYTIC NEVI: ICD-10-CM

## 2024-05-20 DIAGNOSIS — L738 OTHER SPECIFIED DISEASES OF HAIR AND HAIR FOLLICLES: ICD-10-CM

## 2024-05-20 DIAGNOSIS — L663 OTHER SPECIFIED DISEASES OF HAIR AND HAIR FOLLICLES: ICD-10-CM

## 2024-05-20 PROBLEM — L02.426 FURUNCLE OF LEFT LOWER LIMB: Status: ACTIVE | Noted: 2024-05-20

## 2024-05-20 PROBLEM — D22.5 MELANOCYTIC NEVI OF TRUNK: Status: ACTIVE | Noted: 2024-05-20

## 2024-05-20 PROBLEM — D22.71 MELANOCYTIC NEVI OF RIGHT LOWER LIMB, INCLUDING HIP: Status: ACTIVE | Noted: 2024-05-20

## 2024-05-20 PROBLEM — D22.61 MELANOCYTIC NEVI OF RIGHT UPPER LIMB, INCLUDING SHOULDER: Status: ACTIVE | Noted: 2024-05-20

## 2024-05-20 PROBLEM — D22.72 MELANOCYTIC NEVI OF LEFT LOWER LIMB, INCLUDING HIP: Status: ACTIVE | Noted: 2024-05-20

## 2024-05-20 PROBLEM — D18.01 HEMANGIOMA OF SKIN AND SUBCUTANEOUS TISSUE: Status: ACTIVE | Noted: 2024-05-20

## 2024-05-20 PROBLEM — L02.425 FURUNCLE OF RIGHT LOWER LIMB: Status: ACTIVE | Noted: 2024-05-20

## 2024-05-20 PROBLEM — D22.62 MELANOCYTIC NEVI OF LEFT UPPER LIMB, INCLUDING SHOULDER: Status: ACTIVE | Noted: 2024-05-20

## 2024-05-20 PROCEDURE — OTHER COUNSELING: OTHER

## 2024-05-20 PROCEDURE — OTHER SUNSCREEN RECOMMENDATIONS: OTHER

## 2024-05-20 PROCEDURE — 99213 OFFICE O/P EST LOW 20 MIN: CPT

## 2024-05-20 PROCEDURE — OTHER SKIN TAG REMOVAL MULTI (COSMETIC): OTHER

## 2024-05-20 PROCEDURE — OTHER PRESCRIPTION: OTHER

## 2024-05-20 PROCEDURE — OTHER PRESCRIPTION MEDICATION MANAGEMENT: OTHER

## 2024-05-20 RX ORDER — CLINDAMYCIN PHOSPHATE 10 MG/ML
LOTION TOPICAL QD
Qty: 60 | Refills: 5 | Status: ERX

## 2024-05-20 ASSESSMENT — LOCATION ZONE DERM
LOCATION ZONE: SCALP
LOCATION ZONE: NECK
LOCATION ZONE: ARM
LOCATION ZONE: FACE
LOCATION ZONE: LEG
LOCATION ZONE: TRUNK

## 2024-05-20 ASSESSMENT — LOCATION DETAILED DESCRIPTION DERM
LOCATION DETAILED: LEFT POSTERIOR SHOULDER
LOCATION DETAILED: INFERIOR MID FOREHEAD
LOCATION DETAILED: RIGHT PROXIMAL POSTERIOR UPPER ARM
LOCATION DETAILED: RIGHT DISTAL DORSAL FOREARM
LOCATION DETAILED: RIGHT LATERAL TRAPEZIAL NECK
LOCATION DETAILED: LEFT ANTERIOR DISTAL THIGH
LOCATION DETAILED: RIGHT ANTERIOR PROXIMAL THIGH
LOCATION DETAILED: LEFT DISTAL POSTERIOR THIGH
LOCATION DETAILED: INFERIOR THORACIC SPINE
LOCATION DETAILED: RIGHT MEDIAL FRONTAL SCALP
LOCATION DETAILED: LEFT PROXIMAL DORSAL FOREARM
LOCATION DETAILED: LEFT KNEE
LOCATION DETAILED: RIGHT DISTAL POSTERIOR THIGH
LOCATION DETAILED: LEFT MEDIAL UPPER BACK
LOCATION DETAILED: LEFT PROXIMAL POSTERIOR UPPER ARM
LOCATION DETAILED: RIGHT POSTERIOR SHOULDER
LOCATION DETAILED: SUPERIOR THORACIC SPINE
LOCATION DETAILED: RIGHT MEDIAL KNEE
LOCATION DETAILED: LEFT LATERAL TRAPEZIAL NECK

## 2024-05-20 ASSESSMENT — BSA PSORIASIS: % BODY COVERED IN PSORIASIS: 0

## 2024-05-20 ASSESSMENT — LOCATION SIMPLE DESCRIPTION DERM
LOCATION SIMPLE: LEFT KNEE
LOCATION SIMPLE: UPPER BACK
LOCATION SIMPLE: LEFT FOREARM
LOCATION SIMPLE: RIGHT SHOULDER
LOCATION SIMPLE: RIGHT FOREARM
LOCATION SIMPLE: POSTERIOR NECK
LOCATION SIMPLE: LEFT THIGH
LOCATION SIMPLE: RIGHT SCALP
LOCATION SIMPLE: LEFT POSTERIOR THIGH
LOCATION SIMPLE: RIGHT POSTERIOR UPPER ARM
LOCATION SIMPLE: RIGHT KNEE
LOCATION SIMPLE: INFERIOR FOREHEAD
LOCATION SIMPLE: RIGHT POSTERIOR THIGH
LOCATION SIMPLE: LEFT SHOULDER
LOCATION SIMPLE: LEFT POSTERIOR UPPER ARM
LOCATION SIMPLE: LEFT UPPER BACK
LOCATION SIMPLE: RIGHT THIGH

## 2024-05-20 ASSESSMENT — ITCH NUMERIC RATING SCALE: HOW SEVERE IS YOUR ITCHING?: 0

## 2024-05-20 NOTE — PROCEDURE: SKIN TAG REMOVAL MULTI (COSMETIC)
Price (Use Numbers Only, No Special Characters Or $): 152.60
Consent: Written consent obtained and the risks of skin tag removal was reviewed with the patient including but not limited to bleeding, pigmentary change, infection, pain, and remote possibility of scarring.
Anesthesia Type: 1% lidocaine with 1:200,000 epinephrine and a 1:10 solution of 8.4% sodium bicarbonate
Total Number Of Lesions Treated: 14
Detail Level: Simple
Removed With: electrodesiccation
Anesthesia Volume In Cc: 1

## 2024-05-20 NOTE — PROCEDURE: PRESCRIPTION MEDICATION MANAGEMENT
Continue Regimen: Clindamycin lotion QD-BID prn
Render In Strict Bullet Format?: No
Detail Level: Zone
Continue Regimen: Clobetasol solution prn flares